# Patient Record
Sex: MALE | Race: WHITE | HISPANIC OR LATINO | Employment: UNEMPLOYED | URBAN - METROPOLITAN AREA
[De-identification: names, ages, dates, MRNs, and addresses within clinical notes are randomized per-mention and may not be internally consistent; named-entity substitution may affect disease eponyms.]

---

## 2020-07-12 ENCOUNTER — APPOINTMENT (EMERGENCY)
Dept: CT IMAGING | Facility: HOSPITAL | Age: 30
End: 2020-07-12
Payer: COMMERCIAL

## 2020-07-12 ENCOUNTER — HOSPITAL ENCOUNTER (EMERGENCY)
Facility: HOSPITAL | Age: 30
Discharge: HOME | End: 2020-07-12
Attending: EMERGENCY MEDICINE | Admitting: EMERGENCY MEDICINE
Payer: COMMERCIAL

## 2020-07-12 VITALS
SYSTOLIC BLOOD PRESSURE: 140 MMHG | DIASTOLIC BLOOD PRESSURE: 63 MMHG | WEIGHT: 315 LBS | HEART RATE: 80 BPM | TEMPERATURE: 98.1 F | RESPIRATION RATE: 20 BRPM | OXYGEN SATURATION: 97 %

## 2020-07-12 DIAGNOSIS — M54.9 BACK PAIN: Primary | ICD-10-CM

## 2020-07-12 DIAGNOSIS — T80.1XXA INTRAVENOUS INFILTRATION, INITIAL ENCOUNTER: ICD-10-CM

## 2020-07-12 LAB
ALBUMIN SERPL BCP-MCNC: 3.7 G/DL (ref 3.5–5)
ALP SERPL-CCNC: 116 U/L (ref 46–116)
ALT SERPL W P-5'-P-CCNC: 54 U/L (ref 12–78)
ANION GAP SERPL CALCULATED.3IONS-SCNC: 8 MMOL/L (ref 4–13)
AST SERPL W P-5'-P-CCNC: 32 U/L (ref 5–45)
BASOPHILS # BLD AUTO: 0.03 THOUSANDS/ΜL (ref 0–0.1)
BASOPHILS NFR BLD AUTO: 0 % (ref 0–1)
BILIRUB SERPL-MCNC: 0.5 MG/DL (ref 0.2–1)
BILIRUB UR QL STRIP: NEGATIVE
BUN SERPL-MCNC: 11 MG/DL (ref 5–25)
CALCIUM SERPL-MCNC: 8.7 MG/DL (ref 8.3–10.1)
CHLORIDE SERPL-SCNC: 104 MMOL/L (ref 100–108)
CLARITY UR: CLEAR
CO2 SERPL-SCNC: 29 MMOL/L (ref 21–32)
COLOR UR: YELLOW
CREAT SERPL-MCNC: 1.12 MG/DL (ref 0.6–1.3)
EOSINOPHIL # BLD AUTO: 0.34 THOUSAND/ΜL (ref 0–0.61)
EOSINOPHIL NFR BLD AUTO: 4 % (ref 0–6)
ERYTHROCYTE [DISTWIDTH] IN BLOOD BY AUTOMATED COUNT: 12.9 % (ref 11.6–15.1)
GFR SERPL CREATININE-BSD FRML MDRD: 88 ML/MIN/1.73SQ M
GLUCOSE SERPL-MCNC: 133 MG/DL (ref 65–140)
GLUCOSE UR STRIP-MCNC: NEGATIVE MG/DL
HCT VFR BLD AUTO: 51.1 % (ref 36.5–49.3)
HGB BLD-MCNC: 17 G/DL (ref 12–17)
HGB UR QL STRIP.AUTO: NEGATIVE
IMM GRANULOCYTES # BLD AUTO: 0.03 THOUSAND/UL (ref 0–0.2)
IMM GRANULOCYTES NFR BLD AUTO: 0 % (ref 0–2)
KETONES UR STRIP-MCNC: NEGATIVE MG/DL
LEUKOCYTE ESTERASE UR QL STRIP: NEGATIVE
LIPASE SERPL-CCNC: 120 U/L (ref 73–393)
LYMPHOCYTES # BLD AUTO: 2.77 THOUSANDS/ΜL (ref 0.6–4.47)
LYMPHOCYTES NFR BLD AUTO: 33 % (ref 14–44)
MCH RBC QN AUTO: 31.4 PG (ref 26.8–34.3)
MCHC RBC AUTO-ENTMCNC: 33.3 G/DL (ref 31.4–37.4)
MCV RBC AUTO: 94 FL (ref 82–98)
MONOCYTES # BLD AUTO: 0.63 THOUSAND/ΜL (ref 0.17–1.22)
MONOCYTES NFR BLD AUTO: 8 % (ref 4–12)
NEUTROPHILS # BLD AUTO: 4.5 THOUSANDS/ΜL (ref 1.85–7.62)
NEUTS SEG NFR BLD AUTO: 55 % (ref 43–75)
NITRITE UR QL STRIP: NEGATIVE
NRBC BLD AUTO-RTO: 0 /100 WBCS
PH UR STRIP.AUTO: 6.5 [PH]
PLATELET # BLD AUTO: 252 THOUSANDS/UL (ref 149–390)
PMV BLD AUTO: 10.7 FL (ref 8.9–12.7)
POTASSIUM SERPL-SCNC: 3.8 MMOL/L (ref 3.5–5.3)
PROT SERPL-MCNC: 7.7 G/DL (ref 6.4–8.2)
PROT UR STRIP-MCNC: NEGATIVE MG/DL
RBC # BLD AUTO: 5.42 MILLION/UL (ref 3.88–5.62)
SODIUM SERPL-SCNC: 141 MMOL/L (ref 136–145)
SP GR UR STRIP.AUTO: 1.02 (ref 1–1.03)
TROPONIN I SERPL-MCNC: <0.02 NG/ML
UROBILINOGEN UR QL STRIP.AUTO: 0.2 E.U./DL
WBC # BLD AUTO: 8.3 THOUSAND/UL (ref 4.31–10.16)

## 2020-07-12 PROCEDURE — 74150 CT ABDOMEN W/O CONTRAST: CPT

## 2020-07-12 PROCEDURE — 96360 HYDRATION IV INFUSION INIT: CPT

## 2020-07-12 PROCEDURE — 93005 ELECTROCARDIOGRAM TRACING: CPT

## 2020-07-12 PROCEDURE — 85025 COMPLETE CBC W/AUTO DIFF WBC: CPT | Performed by: EMERGENCY MEDICINE

## 2020-07-12 PROCEDURE — 96361 HYDRATE IV INFUSION ADD-ON: CPT

## 2020-07-12 PROCEDURE — 36415 COLL VENOUS BLD VENIPUNCTURE: CPT | Performed by: EMERGENCY MEDICINE

## 2020-07-12 PROCEDURE — 99284 EMERGENCY DEPT VISIT MOD MDM: CPT

## 2020-07-12 PROCEDURE — 71250 CT THORAX DX C-: CPT

## 2020-07-12 PROCEDURE — 80053 COMPREHEN METABOLIC PANEL: CPT | Performed by: EMERGENCY MEDICINE

## 2020-07-12 PROCEDURE — 81003 URINALYSIS AUTO W/O SCOPE: CPT | Performed by: EMERGENCY MEDICINE

## 2020-07-12 PROCEDURE — 83690 ASSAY OF LIPASE: CPT | Performed by: EMERGENCY MEDICINE

## 2020-07-12 PROCEDURE — 99284 EMERGENCY DEPT VISIT MOD MDM: CPT | Performed by: EMERGENCY MEDICINE

## 2020-07-12 PROCEDURE — 84484 ASSAY OF TROPONIN QUANT: CPT | Performed by: EMERGENCY MEDICINE

## 2020-07-12 RX ADMIN — IOHEXOL 100 ML: 350 INJECTION, SOLUTION INTRAVENOUS at 19:01

## 2020-07-12 RX ADMIN — SODIUM CHLORIDE 1000 ML: 0.9 INJECTION, SOLUTION INTRAVENOUS at 16:47

## 2020-07-12 NOTE — ED NOTES
Multiple attempts were made to establish a new IV  Was unable to obtain one, provider made aware       Tyson Bryant RN  07/12/20 3132
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
## 2020-07-12 NOTE — ED NOTES
Ct scan department reports peripheral IV infiltrated during scan and IV removed, this RN placed guaze on right AC, no swelling or redness reported, patient states "I have some pain on my arm", ice pack placed on arm, provider made aware, will continue to monitor      Jayant Mandel, RN  07/12/20 5979

## 2020-07-13 ENCOUNTER — TELEPHONE (OUTPATIENT)
Dept: INTERVENTIONAL RADIOLOGY/VASCULAR | Facility: HOSPITAL | Age: 30
End: 2020-07-13

## 2020-07-13 LAB
ATRIAL RATE: 94 BPM
P AXIS: 61 DEGREES
PR INTERVAL: 142 MS
QRS AXIS: 56 DEGREES
QRSD INTERVAL: 100 MS
QT INTERVAL: 376 MS
QTC INTERVAL: 470 MS
T WAVE AXIS: -5 DEGREES
VENTRICULAR RATE: 94 BPM

## 2020-07-13 PROCEDURE — 93010 ELECTROCARDIOGRAM REPORT: CPT | Performed by: INTERNAL MEDICINE

## 2020-07-13 NOTE — ED NOTES
Discharged reviewed with pt  Pt verbalized understanding and has no further questions at this time  Pt ambulatory off unit with steady gait       Elizabeth Osgood, RN  07/12/20 3244

## 2020-07-13 NOTE — TELEPHONE ENCOUNTER
Follow up done for IV contrast extravasation  Pt reports swelling has decreased  No other symptoms at this time

## 2020-08-04 ENCOUNTER — HOSPITAL ENCOUNTER (EMERGENCY)
Facility: HOSPITAL | Age: 30
Discharge: HOME | End: 2020-08-04
Attending: EMERGENCY MEDICINE | Admitting: EMERGENCY MEDICINE
Payer: COMMERCIAL

## 2020-08-04 ENCOUNTER — APPOINTMENT (EMERGENCY)
Dept: RADIOLOGY | Facility: HOSPITAL | Age: 30
End: 2020-08-04
Payer: COMMERCIAL

## 2020-08-04 VITALS
OXYGEN SATURATION: 95 % | DIASTOLIC BLOOD PRESSURE: 80 MMHG | WEIGHT: 315 LBS | SYSTOLIC BLOOD PRESSURE: 135 MMHG | HEART RATE: 76 BPM | HEIGHT: 74 IN | RESPIRATION RATE: 15 BRPM | BODY MASS INDEX: 40.43 KG/M2 | TEMPERATURE: 98.9 F

## 2020-08-04 DIAGNOSIS — R07.9 CHEST PAIN: Primary | ICD-10-CM

## 2020-08-04 LAB
ALBUMIN SERPL BCP-MCNC: 3.4 G/DL (ref 3.5–5)
ALP SERPL-CCNC: 103 U/L (ref 46–116)
ALT SERPL W P-5'-P-CCNC: 47 U/L (ref 12–78)
ANION GAP SERPL CALCULATED.3IONS-SCNC: 10 MMOL/L (ref 4–13)
AST SERPL W P-5'-P-CCNC: 23 U/L (ref 5–45)
ATRIAL RATE: 83 BPM
BASOPHILS # BLD AUTO: 0.03 THOUSANDS/ΜL (ref 0–0.1)
BASOPHILS NFR BLD AUTO: 0 % (ref 0–1)
BILIRUB SERPL-MCNC: 0.3 MG/DL (ref 0.2–1)
BUN SERPL-MCNC: 14 MG/DL (ref 5–25)
CALCIUM SERPL-MCNC: 8.6 MG/DL (ref 8.3–10.1)
CHLORIDE SERPL-SCNC: 105 MMOL/L (ref 100–108)
CO2 SERPL-SCNC: 26 MMOL/L (ref 21–32)
CREAT SERPL-MCNC: 0.94 MG/DL (ref 0.6–1.3)
EOSINOPHIL # BLD AUTO: 0.25 THOUSAND/ΜL (ref 0–0.61)
EOSINOPHIL NFR BLD AUTO: 3 % (ref 0–6)
ERYTHROCYTE [DISTWIDTH] IN BLOOD BY AUTOMATED COUNT: 12.9 % (ref 11.6–15.1)
GFR SERPL CREATININE-BSD FRML MDRD: 108 ML/MIN/1.73SQ M
GLUCOSE SERPL-MCNC: 111 MG/DL (ref 65–140)
HCT VFR BLD AUTO: 45 % (ref 36.5–49.3)
HGB BLD-MCNC: 14.9 G/DL (ref 12–17)
IMM GRANULOCYTES # BLD AUTO: 0.02 THOUSAND/UL (ref 0–0.2)
IMM GRANULOCYTES NFR BLD AUTO: 0 % (ref 0–2)
LYMPHOCYTES # BLD AUTO: 3.68 THOUSANDS/ΜL (ref 0.6–4.47)
LYMPHOCYTES NFR BLD AUTO: 43 % (ref 14–44)
MCH RBC QN AUTO: 31 PG (ref 26.8–34.3)
MCHC RBC AUTO-ENTMCNC: 33.1 G/DL (ref 31.4–37.4)
MCV RBC AUTO: 94 FL (ref 82–98)
MONOCYTES # BLD AUTO: 0.7 THOUSAND/ΜL (ref 0.17–1.22)
MONOCYTES NFR BLD AUTO: 8 % (ref 4–12)
NEUTROPHILS # BLD AUTO: 3.9 THOUSANDS/ΜL (ref 1.85–7.62)
NEUTS SEG NFR BLD AUTO: 46 % (ref 43–75)
NRBC BLD AUTO-RTO: 0 /100 WBCS
P AXIS: 32 DEGREES
PLATELET # BLD AUTO: 216 THOUSANDS/UL (ref 149–390)
PMV BLD AUTO: 11.2 FL (ref 8.9–12.7)
POTASSIUM SERPL-SCNC: 3.8 MMOL/L (ref 3.5–5.3)
PR INTERVAL: 138 MS
PROT SERPL-MCNC: 6.9 G/DL (ref 6.4–8.2)
QRS AXIS: 63 DEGREES
QRSD INTERVAL: 102 MS
QT INTERVAL: 380 MS
QTC INTERVAL: 446 MS
RBC # BLD AUTO: 4.8 MILLION/UL (ref 3.88–5.62)
SODIUM SERPL-SCNC: 141 MMOL/L (ref 136–145)
T WAVE AXIS: 14 DEGREES
TROPONIN I SERPL-MCNC: <0.02 NG/ML
VENTRICULAR RATE: 83 BPM
WBC # BLD AUTO: 8.58 THOUSAND/UL (ref 4.31–10.16)

## 2020-08-04 PROCEDURE — 99285 EMERGENCY DEPT VISIT HI MDM: CPT

## 2020-08-04 PROCEDURE — 85025 COMPLETE CBC W/AUTO DIFF WBC: CPT

## 2020-08-04 PROCEDURE — 71046 X-RAY EXAM CHEST 2 VIEWS: CPT

## 2020-08-04 PROCEDURE — 93010 ELECTROCARDIOGRAM REPORT: CPT | Performed by: INTERNAL MEDICINE

## 2020-08-04 PROCEDURE — 93005 ELECTROCARDIOGRAM TRACING: CPT

## 2020-08-04 PROCEDURE — 80053 COMPREHEN METABOLIC PANEL: CPT

## 2020-08-04 PROCEDURE — 36415 COLL VENOUS BLD VENIPUNCTURE: CPT

## 2020-08-04 PROCEDURE — 96374 THER/PROPH/DIAG INJ IV PUSH: CPT

## 2020-08-04 PROCEDURE — 99285 EMERGENCY DEPT VISIT HI MDM: CPT | Performed by: EMERGENCY MEDICINE

## 2020-08-04 PROCEDURE — 84484 ASSAY OF TROPONIN QUANT: CPT

## 2020-08-04 RX ORDER — KETOROLAC TROMETHAMINE 30 MG/ML
15 INJECTION, SOLUTION INTRAMUSCULAR; INTRAVENOUS ONCE
Status: COMPLETED | OUTPATIENT
Start: 2020-08-04 | End: 2020-08-04

## 2020-08-04 RX ORDER — NAPROXEN 500 MG/1
500 TABLET ORAL 2 TIMES DAILY WITH MEALS
Qty: 30 TABLET | Refills: 0 | Status: SHIPPED | OUTPATIENT
Start: 2020-08-04 | End: 2020-08-14 | Stop reason: HOSPADM

## 2020-08-04 RX ADMIN — KETOROLAC TROMETHAMINE 15 MG: 30 INJECTION, SOLUTION INTRAMUSCULAR at 08:40

## 2020-08-04 NOTE — DISCHARGE INSTRUCTIONS
Take naproxen as needed for pain  Please call the number provided as soon as possible for primary care doctor  Determining the exact cause for all patients with chest pain is extremely difficult in the emergency department  Our primary focus is to rule-out immediate life-threatening diseases  If this cannot be done, the definitive diagnosis frequently needs to be determined over time  Sometimes, specialist are required cardiologists, pulmonologists, gastroenterologists or surgeons  Many times the primary care physcians can determine the cause by following the symptoms over time  Please return immediately to the Emergency Department for fever>101, vomiting, shortness of breath, worsening symptoms, pain with exertion, passing out, intractable pain or any other concerns

## 2020-08-12 ENCOUNTER — APPOINTMENT (EMERGENCY)
Dept: CT IMAGING | Facility: HOSPITAL | Age: 30
End: 2020-08-12
Payer: COMMERCIAL

## 2020-08-12 ENCOUNTER — HOSPITAL ENCOUNTER (OUTPATIENT)
Facility: HOSPITAL | Age: 30
Setting detail: OBSERVATION
Discharge: HOME | End: 2020-08-14
Attending: EMERGENCY MEDICINE | Admitting: INTERNAL MEDICINE
Payer: COMMERCIAL

## 2020-08-12 DIAGNOSIS — R29.898 ARM WEAKNESS: Primary | ICD-10-CM

## 2020-08-12 DIAGNOSIS — R29.898 LEG WEAKNESS: ICD-10-CM

## 2020-08-12 PROBLEM — I10 ESSENTIAL HYPERTENSION: Status: ACTIVE | Noted: 2020-08-12

## 2020-08-12 PROBLEM — Z72.0 TOBACCO ABUSE: Status: ACTIVE | Noted: 2020-08-12

## 2020-08-12 PROBLEM — E66.01 MORBID OBESITY (HCC): Status: ACTIVE | Noted: 2020-08-12

## 2020-08-12 LAB
ANION GAP SERPL CALCULATED.3IONS-SCNC: 6 MMOL/L (ref 4–13)
APTT PPP: 27 SECONDS (ref 23–37)
BUN SERPL-MCNC: 11 MG/DL (ref 5–25)
CALCIUM SERPL-MCNC: 9.1 MG/DL (ref 8.3–10.1)
CHLORIDE SERPL-SCNC: 105 MMOL/L (ref 100–108)
CO2 SERPL-SCNC: 30 MMOL/L (ref 21–32)
CREAT SERPL-MCNC: 0.9 MG/DL (ref 0.6–1.3)
ERYTHROCYTE [DISTWIDTH] IN BLOOD BY AUTOMATED COUNT: 12.7 % (ref 11.6–15.1)
GFR SERPL CREATININE-BSD FRML MDRD: 114 ML/MIN/1.73SQ M
GLUCOSE SERPL-MCNC: 117 MG/DL (ref 65–140)
GLUCOSE SERPL-MCNC: 96 MG/DL (ref 65–140)
HCT VFR BLD AUTO: 48 % (ref 36.5–49.3)
HGB BLD-MCNC: 16 G/DL (ref 12–17)
INR PPP: 1.01 (ref 0.84–1.19)
MCH RBC QN AUTO: 31.2 PG (ref 26.8–34.3)
MCHC RBC AUTO-ENTMCNC: 33.3 G/DL (ref 31.4–37.4)
MCV RBC AUTO: 94 FL (ref 82–98)
PLATELET # BLD AUTO: 219 THOUSANDS/UL (ref 149–390)
PMV BLD AUTO: 10.7 FL (ref 8.9–12.7)
POTASSIUM SERPL-SCNC: 4 MMOL/L (ref 3.5–5.3)
PROTHROMBIN TIME: 12.8 SECONDS (ref 11.6–14.5)
RBC # BLD AUTO: 5.13 MILLION/UL (ref 3.88–5.62)
SARS-COV-2 RNA RESP QL NAA+PROBE: NEGATIVE
SODIUM SERPL-SCNC: 141 MMOL/L (ref 136–145)
TROPONIN I SERPL-MCNC: <0.02 NG/ML
WBC # BLD AUTO: 6.42 THOUSAND/UL (ref 4.31–10.16)

## 2020-08-12 PROCEDURE — 99284 EMERGENCY DEPT VISIT MOD MDM: CPT | Performed by: EMERGENCY MEDICINE

## 2020-08-12 PROCEDURE — 85610 PROTHROMBIN TIME: CPT | Performed by: EMERGENCY MEDICINE

## 2020-08-12 PROCEDURE — 70496 CT ANGIOGRAPHY HEAD: CPT

## 2020-08-12 PROCEDURE — 87635 SARS-COV-2 COVID-19 AMP PRB: CPT | Performed by: EMERGENCY MEDICINE

## 2020-08-12 PROCEDURE — 82948 REAGENT STRIP/BLOOD GLUCOSE: CPT

## 2020-08-12 PROCEDURE — 93005 ELECTROCARDIOGRAM TRACING: CPT

## 2020-08-12 PROCEDURE — 70498 CT ANGIOGRAPHY NECK: CPT

## 2020-08-12 PROCEDURE — 36415 COLL VENOUS BLD VENIPUNCTURE: CPT | Performed by: EMERGENCY MEDICINE

## 2020-08-12 PROCEDURE — 99220 PR INITIAL OBSERVATION CARE/DAY 70 MINUTES: CPT | Performed by: INTERNAL MEDICINE

## 2020-08-12 PROCEDURE — G1004 CDSM NDSC: HCPCS

## 2020-08-12 PROCEDURE — 99285 EMERGENCY DEPT VISIT HI MDM: CPT

## 2020-08-12 PROCEDURE — 80048 BASIC METABOLIC PNL TOTAL CA: CPT | Performed by: EMERGENCY MEDICINE

## 2020-08-12 PROCEDURE — 84484 ASSAY OF TROPONIN QUANT: CPT | Performed by: EMERGENCY MEDICINE

## 2020-08-12 PROCEDURE — 85027 COMPLETE CBC AUTOMATED: CPT | Performed by: EMERGENCY MEDICINE

## 2020-08-12 PROCEDURE — 85730 THROMBOPLASTIN TIME PARTIAL: CPT | Performed by: EMERGENCY MEDICINE

## 2020-08-12 RX ORDER — ASPIRIN 81 MG/1
81 TABLET, CHEWABLE ORAL DAILY
Status: DISCONTINUED | OUTPATIENT
Start: 2020-08-13 | End: 2020-08-14

## 2020-08-12 RX ORDER — AMLODIPINE BESYLATE 5 MG/1
5 TABLET ORAL DAILY
Status: DISCONTINUED | OUTPATIENT
Start: 2020-08-13 | End: 2020-08-14 | Stop reason: HOSPADM

## 2020-08-12 RX ORDER — HEPARIN SODIUM 5000 [USP'U]/ML
5000 INJECTION, SOLUTION INTRAVENOUS; SUBCUTANEOUS EVERY 8 HOURS SCHEDULED
Status: DISCONTINUED | OUTPATIENT
Start: 2020-08-12 | End: 2020-08-14 | Stop reason: HOSPADM

## 2020-08-12 RX ORDER — ASPIRIN 325 MG
325 TABLET ORAL ONCE
Status: COMPLETED | OUTPATIENT
Start: 2020-08-12 | End: 2020-08-12

## 2020-08-12 RX ORDER — ATORVASTATIN CALCIUM 40 MG/1
40 TABLET, FILM COATED ORAL EVERY EVENING
Status: DISCONTINUED | OUTPATIENT
Start: 2020-08-12 | End: 2020-08-14

## 2020-08-12 RX ORDER — NICOTINE 21 MG/24HR
1 PATCH, TRANSDERMAL 24 HOURS TRANSDERMAL DAILY
Status: DISCONTINUED | OUTPATIENT
Start: 2020-08-13 | End: 2020-08-14 | Stop reason: HOSPADM

## 2020-08-12 RX ORDER — AMLODIPINE BESYLATE 5 MG/1
5 TABLET ORAL DAILY
COMMUNITY

## 2020-08-12 RX ORDER — ONDANSETRON 2 MG/ML
4 INJECTION INTRAMUSCULAR; INTRAVENOUS EVERY 6 HOURS PRN
Status: DISCONTINUED | OUTPATIENT
Start: 2020-08-12 | End: 2020-08-14 | Stop reason: HOSPADM

## 2020-08-12 RX ADMIN — IOHEXOL 85 ML: 350 INJECTION, SOLUTION INTRAVENOUS at 17:32

## 2020-08-12 RX ADMIN — ASPIRIN 325 MG ORAL TABLET 325 MG: 325 PILL ORAL at 18:53

## 2020-08-12 RX ADMIN — ATORVASTATIN CALCIUM 40 MG: 40 TABLET, FILM COATED ORAL at 20:24

## 2020-08-12 RX ADMIN — HEPARIN SODIUM 5000 UNITS: 5000 INJECTION INTRAVENOUS; SUBCUTANEOUS at 21:34

## 2020-08-12 NOTE — H&P
History and Physical - [facility] Internal Medicine    Patient Information: Fely Torres 26 y o  male MRN: [id number]  Unit/Bed#: -01 Encounter: [id number]  Admitting Physician: Cecilio Suazo MD  PCP: Elina Strauss MD  Date of Admission:  08/12/20    Assessment/Plan:      * Arm weakness  Assessment & Plan  ? Stroke  Stroke workup in process  Would give him aspirin and statin  Continue his antihypertensive medication with hold parameter  PT/OT  Neurology service has already called in from the ER  Patient is also morbidly obese and he was advised on healthy diet and exercise to lose some weight    Tobacco abuse  Assessment & Plan  Nicotine patch and counseling    Morbid obesity ([facility] [address] )  Assessment & Plan  Diet and exercise    Essential hypertension  Assessment & Plan  Continue home medications with some hold parameters      Present on Admission:   Arm weakness   Essential hypertension   Morbid obesity ([facility] [address] )   Tobacco abuse        VTE Prophylaxis: Heparin  / sequential compression device   Code Status:  Full code  POLST: There is no POLST form on file for this patient (pre-hospital)    Anticipated Length of Stay:  Patient will be admitted on an Observation basis with an anticipated length of stay of  less than 2 midnights  Justification for Hospital Stay:  Left arm weakness    Total Time for Visit, including Counseling / Coordination of Care: 45+ minutes  Greater than 50% of this total time spent on direct patient counseling and coordination of care  Chief Complaint:   Off and on left arm/left-sided weakness    History of Present Illness:    Fely Torres is a 26 y o  male who presents with off and on left-sided weakness  As per patient, he has been having some trouble walking at times and also dizzy and lightheaded  He saw his primary care physician    He was put on antihypertensive medication and asked to see a neurologist   He came here as his appointment with neurologist was not until 1st week of next month  He is left handed, however, at the moment he states that his left arm weakness symptoms have resolved and he is back to baseline  No chest pain  No shortness of breath  No nausea vomiting  No fever or chills  Review of Systems    All systems are reviewed  Positive as per history of presenting illness  Patient answered no to all other questions  Past Medical and Surgical History:     Past Medical History:   Diagnosis Date    Hypertension        Past Surgical History:   Procedure Laterality Date    SHOULDER SURGERY         Meds/Allergies:    Prior to Admission medications    Medication Sig Start Date End Date Taking? Authorizing Provider   amLODIPine (NORVASC) 5 mg tablet Take 5 mg by mouth daily   Yes Historical Provider, MD   naproxen (NAPROSYN) 500 mg tablet Take 1 tablet (500 mg total) by mouth 2 (two) times a day with meals  Patient not taking: Reported on 8/12/2020 8/4/20   Lul Arroyo MD     Reviewed as documented above    Allergies: No Known Allergies    Social History:     Marital Status: Single   Occupation:  Working  Patient Pre-hospital Living Situation:  With family  Patient Pre-hospital Level of Mobility:  Independent  Patient Pre-hospital Diet Restrictions:  None  Substance Use History:   Social History     Substance and Sexual Activity   Alcohol Use Yes     Social History     Tobacco Use   Smoking Status Current Every Day Smoker    Packs/day: 0 50   Smokeless Tobacco Never Used     Social History     Substance and Sexual Activity   Drug Use Yes    Types: Marijuana       Family History:    Family history is reviewed  He has a brother who had a stroke at age 32   Also has an uncle who had a stroke at age 47        Physical Exam      Vitals:   Blood Pressure: 119/77 (08/12/20 1845)  Pulse: 82 (08/12/20 1845)  Temperature: 98 5 °F (36 9 °C) (08/12/20 1558)  Temp Source: Oral (08/12/20 1558)  Respirations: 15 (08/12/20 1845)  Height: 6' 2" (188 cm) (08/12/20 1558)  Weight - Scale: (!) 165 kg (363 lb 8 6 oz) (08/12/20 1558)  SpO2: 95 % (08/12/20 1845)    Vital signs are reviewed as above  Constitutional:  Obese male who is sitting in stretcher in the ER  Not in any respiratory distress  Eyes: EOM grossly intact  Conjunctivae slightly pale  Patient has anicteric sclera  HENT: Oropharynx are crowded and moist  Did not notice any significant lesions on the tongue  Head normocephalic  Neck: Neck is obese and supple  I was not able to visualize any JVD at 90°  There is no significant lymphadenopathy  I also did not notice any significant thyromegaly  Cardiac: I did not hear any rubs or gallop  Patient appears to be in sinus rhythm  Respiratory: Patient not in significant respiratory distress  Air entry in general is fair  GI: Abdomen is obese and soft  It is grossly nontender  Bowel sounds are adequate  I was not able to appreciate any hepatosplenomegaly  Neurologic:  Patient is awake and alert  Neurological examination is grossly intact  No obvious focal neurological deficit noticed  Skin: Skin is warm and dry  Psychiatric: Mood and affect are pleasant  Musculoskeletal  Patient moving all extremities   Has chronic arthritic joints   Extremities: Patient has no significant cyanosis, clubbing, or lower extremity edema           Additional Data:     Lab Results: I have personally reviewed pertinent reports  Results from last 7 days   Lab Units 08/12/20  1643   WBC Thousand/uL 6 42   HEMOGLOBIN g/dL 16 0   HEMATOCRIT % 48 0   PLATELETS Thousands/uL 219     Results from last 7 days   Lab Units 08/12/20  1643   POTASSIUM mmol/L 4 0   CHLORIDE mmol/L 105   CO2 mmol/L 30   BUN mg/dL 11   CREATININE mg/dL 0 90   CALCIUM mg/dL 9 1     Results from last 7 days   Lab Units 08/12/20  1643   INR  1 01       Imaging: I have personally reviewed pertinent reports        Cta Head And Neck W Wo Contrast    Result Date: 8/12/2020  Narrative: CTA NECK AND BRAIN WITH AND WITHOUT CONTRAST INDICATION: Stroke, follow up subacute cva suspected COMPARISON:   None  TECHNIQUE:  Routine CT imaging of the Brain without contrast   Post contrast imaging was performed after administration of iodinated contrast through the neck and brain  Post contrast axial 0 625 mm images timed to opacify the arterial system  3D rendering was performed on an independent workstation  MIP reconstructions performed  Coronal reconstructions were performed of the noncontrast portion of the brain  Radiation dose length product (DLP) for this visit:  1318 mGy-cm   This examination, like all CT scans performed in the [facility], was performed utilizing techniques to minimize radiation dose exposure, including the use of iterative reconstruction and automated exposure control  IV Contrast:  85 mL of iohexol (OMNIPAQUE)  IMAGE QUALITY:   Diagnostic FINDINGS: NONCONTRAST BRAIN PARENCHYMA:  No intracranial mass, mass effect or midline shift  No CT signs of acute infarction  No acute parenchymal hemorrhage  VENTRICLES AND EXTRA-AXIAL SPACES:  Normal for the patient's age  VISUALIZED ORBITS AND PARANASAL SINUSES:  Unremarkable  CERVICAL VASCULATURE AORTIC ARCH AND GREAT VESSELS:  Normal aortic arch and great vessel origins  Normal visualized subclavian vessels  RIGHT VERTEBRAL ARTERY CERVICAL SEGMENT:  Normal origin  The vessel is normal in caliber throughout the neck  LEFT VERTEBRAL ARTERY CERVICAL SEGMENT:  Normal origin  The vessel is normal in caliber throughout the neck  RIGHT EXTRACRANIAL CAROTID SEGMENT:  Normal caliber common carotid artery  Normal bifurcation and cervical internal carotid artery  No stenosis or dissection  LEFT EXTRACRANIAL CAROTID SEGMENT:  Normal caliber common carotid artery  Normal bifurcation and cervical internal carotid artery  No stenosis or dissection  NASCET criteria was used to determine the degree of internal carotid artery diameter stenosis   INTRACRANIAL VASCULATURE INTERNAL CAROTID ARTERIES:  Normal enhancement of the intracranial portions of the internal carotid arteries  Normal ophthalmic artery origins  Normal ICA terminus  ANTERIOR CIRCULATION:  Symmetric A1 segments and anterior cerebral arteries with normal enhancement  Normal anterior communicating artery  MIDDLE CEREBRAL ARTERY CIRCULATION:  M1 segment and middle cerebral artery branches demonstrate normal enhancement bilaterally  DISTAL VERTEBRAL ARTERIES:  Normal distal vertebral arteries  Posterior inferior cerebellar artery origins are normal  Normal vertebral basilar junction  BASILAR ARTERY:  Basilar artery is normal in caliber  Normal superior cerebellar arteries  POSTERIOR CEREBRAL ARTERIES: Both posterior cerebral arteries arises from the basilar tip  Both arteries demonstrate normal enhancement  Normal posterior communicating arteries  DURAL VENOUS SINUSES:  Normal  NON VASCULAR ANATOMY BONY STRUCTURES:  No acute osseous abnormality  SOFT TISSUES OF THE NECK:  Normal  THORACIC INLET:  Unremarkable  Impression: 1  No evidence of acute intracranial hemorrhage  2  No evidence of hemodynamic significant stenosis, aneurysm or dissection  If persistent focal neurologic deficit, consider follow-up noncontrast brain MRI Workstation performed: [id number]     Xr Chest 2 Views    Result Date: 8/4/2020  Narrative: CHEST INDICATION:   Chest Pain  COMPARISON:  June 12, 2020 EXAM PERFORMED/VIEWS:  XR CHEST PA & LATERAL Images: 2 FINDINGS:  Lungs adequately aerated  No lobar consolidation or large effusion  Scarring left lower lobe  Cardiac size within normal limits  No vascular congestion or peribronchial thickening  No pneumothorax or free air  Osseous structures appear within normal limits for patient age  Impression: No acute cardiopulmonary disease  Workstation performed: [id number]       EKG, Pathology, and Other Studies Reviewed on Admission:   · EKG:  Sinus rhythm    Do not see any acute ischemic changes    Allscripts Records Reviewed: No     ** Please Note: Dragon 360 Dictation voice to text software may have been used in the creation of this document   **

## 2020-08-12 NOTE — ASSESSMENT & PLAN NOTE
Left face, arm and leg weakness rule out stroke  Patient with H/o left-sided face, arm and leg weakness  Patient also complains of B/L blurry vision, lightheadedness, slurry speech started intermittently followed by constant since 3 weeks  All labs including troponin x3, EKG, CTA head and neck, MRI brain, lipid panel, PT/PTT are normal   Neurology was consulted and are thinking symptoms possibly due to stress reaction  Patient does complain of sleep deprivation, feeling of apprehension during sleep, dyspnea during sleep, not using CPAP at home  Plan  -MRI of brain was normal   -continue PT OT  -stop aspirin , heparin and statin  -swallow study showed no difficulty swallowing

## 2020-08-12 NOTE — ED PROVIDER NOTES
History  Chief Complaint   Patient presents with    Weakness - Generalized     states he has left sided weakness and dizziness for 2 weeks  put on BP meds 2 weeks ago and had similar symptoms with no improvement  Patient is a 26-year-old male past medical history of hypertension presenting with left arm, leg, facial weakness x2 0 5 weeks  Patient states that he has had intermittent weakness to the entire left side of his body, which has been constant for several days now  He states that he was seen here previously and received a CT scan but was unable to receive IV dye and 4 was sent home  He states he follow-up with his primary care about these complaints 2 weeks ago and was placed on blood pressure medication and told to follow up with Neurology, limit on 09/02  He he states that nothing seems to make the weakness any better or worse and also admits to intermittent decreased sensation to that side  He also notes intermittent stuttering of speech in issues finding words  He denies any falls or head injuries, any other new medications and states he is eating and drinking normally  He also notes lightheadedness in that same time frame and bilaterally blurred vision  He denies any chest pain, shortness breath, fevers, nausea vomiting, rashes, dysuria  Denies any sick contacts, travel  He states that he is concerned because he has a brother and uncle who had strokes and his brother was in his 35s  Prior to Admission Medications   Prescriptions Last Dose Informant Patient Reported? Taking?    amLODIPine (NORVASC) 5 mg tablet   Yes Yes   Sig: Take 5 mg by mouth daily   naproxen (NAPROSYN) 500 mg tablet Not Taking at Unknown time  No No   Sig: Take 1 tablet (500 mg total) by mouth 2 (two) times a day with meals   Patient not taking: Reported on 8/12/2020      Facility-Administered Medications: None       Past Medical History:   Diagnosis Date    Hypertension        Past Surgical History: Procedure Laterality Date    SHOULDER SURGERY         History reviewed  No pertinent family history  I have reviewed and agree with the history as documented  E-Cigarette/Vaping     E-Cigarette/Vaping Substances     Social History     Tobacco Use    Smoking status: Current Every Day Smoker     Packs/day: 0 50    Smokeless tobacco: Never Used   Substance Use Topics    Alcohol use: Yes    Drug use: Yes     Types: Marijuana       Review of Systems   All other systems reviewed and are negative  Physical Exam  Physical Exam  Constitutional:       Appearance: Normal appearance  He is not ill-appearing  HENT:      Head: Normocephalic and atraumatic  Mouth/Throat:      Mouth: Mucous membranes are moist    Eyes:      Extraocular Movements: Extraocular movements intact  Conjunctiva/sclera: Conjunctivae normal       Pupils: Pupils are equal, round, and reactive to light  Neck:      Musculoskeletal: Normal range of motion  Cardiovascular:      Rate and Rhythm: Normal rate and regular rhythm  Heart sounds: Normal heart sounds  Pulmonary:      Effort: Pulmonary effort is normal       Breath sounds: Normal breath sounds  Abdominal:      General: Abdomen is flat  Palpations: Abdomen is soft  Musculoskeletal: Normal range of motion  General: No tenderness  Comments: No midline spinal tenderness   Skin:     General: Skin is warm  Neurological:      Mental Status: He is alert and oriented to person, place, and time  Cranial Nerves: No cranial nerve deficit  Coordination: Coordination normal       Gait: Gait normal       Comments: Patient reports subjectively decreased sensation to the left upper and lower extremity with intact sensation to sharp and dull touch to the left upper and lower extremity    Patient has mildly decreased motor strength of the upper and lower extremity but no drift   Psychiatric:         Mood and Affect: Mood normal          Vital Signs  ED Triage Vitals [08/12/20 1558]   Temperature Pulse Respirations Blood Pressure SpO2   98 5 °F (36 9 °C) 90 16 162/86 97 %      Temp Source Heart Rate Source Patient Position - Orthostatic VS BP Location FiO2 (%)   Oral Monitor -- Right arm --      Pain Score       --           Vitals:    08/12/20 1558 08/12/20 1645 08/12/20 1745   BP: 162/86 119/69 129/74   Pulse: 90 84 78         Visual Acuity  Visual Acuity      Most Recent Value   L Pupil Size (mm)  4   R Pupil Size (mm)  4          ED Medications  Medications   iohexol (OMNIPAQUE) 350 MG/ML injection (MULTI-DOSE) 85 mL (85 mL Intravenous Given 8/12/20 1732)       Diagnostic Studies  Results Reviewed     Procedure Component Value Units Date/Time    Novel Coronavirus Levi Carter [[id number]]  (Normal) Collected:  08/12/20 1643    Lab Status:  Final result Specimen:  Nares from Nose Updated:  08/12/20 1741     SARS-CoV-2 Negative    Narrative: The specimen collection materials, transport medium, and/or testing methodology utilized in the production of these test results have been proven to be reliable in a limited validation with an abbreviated program under the Emergency Utilization Authorization provided by the FDA  Testing reported as "Presumptive positive" will be confirmed with secondary testing with a reference laboratory to ensure result accuracy  Clinical caution and judgement should be used with the interpretation of these results with consideration of the clinical impression and other laboratory testing  Testing reported as "Positive" or "Negative" has been proven to be accurate according to standard laboratory validation requirements  All testing is performed with control materials showing appropriate reactivity at standard intervals        Troponin I [[id number]]  (Normal) Collected:  08/12/20 1643    Lab Status:  Final result Specimen:  Blood from Arm, Right Updated:  08/12/20 1711     Troponin I <0 02 ng/mL     Protime-INR [[id number]] (Normal) Collected:  08/12/20 1643    Lab Status:  Final result Specimen:  Blood from Arm, Right Updated:  08/12/20 1703     Protime 12 8 seconds      INR 1 01    APTT [[id number]]  (Normal) Collected:  08/12/20 1643    Lab Status:  Final result Specimen:  Blood from Arm, Right Updated:  08/12/20 1703     PTT 27 seconds     Basic metabolic panel [[id number]] Collected:  08/12/20 1643    Lab Status:  Final result Specimen:  Blood from Arm, Right Updated:  08/12/20 1702     Sodium 141 mmol/L      Potassium 4 0 mmol/L      Chloride 105 mmol/L      CO2 30 mmol/L      ANION GAP 6 mmol/L      BUN 11 mg/dL      Creatinine 0 90 mg/dL      Glucose 96 mg/dL      Calcium 9 1 mg/dL      eGFR 114 ml/min/1 73sq m     Narrative:       [place] guidelines for Chronic Kidney Disease (CKD):     Stage 1 with normal or high GFR (GFR > 90 mL/min/1 73 square meters)    Stage 2 Mild CKD (GFR = 60-89 mL/min/1 73 square meters)    Stage 3A Moderate CKD (GFR = 45-59 mL/min/1 73 square meters)    Stage 3B Moderate CKD (GFR = 30-44 mL/min/1 73 square meters)    Stage 4 Severe CKD (GFR = 15-29 mL/min/1 73 square meters)    Stage 5 End Stage CKD (GFR <15 mL/min/1 73 square meters)  Note: GFR calculation is accurate only with a steady state creatinine    CBC and Platelet [[id number]]  (Normal) Collected:  08/12/20 1643    Lab Status:  Final result Specimen:  Blood from Arm, Right Updated:  08/12/20 1649     WBC 6 42 Thousand/uL      RBC 5 13 Million/uL      Hemoglobin 16 0 g/dL      Hematocrit 48 0 %      MCV 94 fL      MCH 31 2 pg      MCHC 33 3 g/dL      RDW 12 7 %      Platelets 642 Thousands/uL      MPV 10 7 fL     Fingerstick Glucose (POCT) [[id number]]  (Normal) Collected:  08/12/20 1600    Lab Status:  Final result Updated:  08/12/20 1601     POC Glucose 117 mg/dl                  CTA head and neck w wo contrast   Final Result by Gen Thomas MD (08/12 1754)         1   No evidence of acute intracranial hemorrhage  2  No evidence of hemodynamic significant stenosis, aneurysm or dissection  If persistent focal neurologic deficit, consider follow-up noncontrast brain MRI                  Workstation performed: [id number]                    Procedures  Procedures         ED Course  ED Course as of Aug 12 1836   Wed Aug 12, 2020   1800 CTA head and neck w wo contrast   1836 Patient admitted under observation for MRI per neurology, all studies negative  US AUDIT      Most Recent Value   Initial Alcohol Screen: US AUDIT-C    1  How often do you have a drink containing alcohol?  0 Filed at: 08/12/2020 1559   2  How many drinks containing alcohol do you have on a typical day you are drinking? 0 Filed at: 08/12/2020 1559   3a  Male UNDER 65: How often do you have five or more drinks on one occasion? 0 Filed at: 08/12/2020 1559   3b  FEMALE Any Age, or MALE 65+: How often do you have 4 or more drinks on one occassion? 0 Filed at: 08/12/2020 1559   Audit-C Score  0 Filed at: 08/12/2020 1559                Stroke Assessment     Row Name 08/12/20 1702             NIH Stroke Scale    Interval  7-10 days      Level of Consciousness (1a )  0      LOC Questions (1b )  0      LOC Commands (1c )  0      Best Gaze (2 )  0      Visual (3 )  0      Facial Palsy (4 )  0      Motor Arm, Left (5a )  0      Motor Arm, Right (5b )  0      Motor Leg, Left (6a )  0      Motor Leg, Right (6b )  0      Limb Ataxia (7 )  0      Sensory (8 )  0      Best Language (9 )  0      Dysarthria (10 )  0      Extinction and Inattention (11 ) (Formerly Neglect)  0      Total  0          TEZ/DAST-10      Most Recent Value   How many times in the past year have you    Used an illegal drug or used a prescription medication for non-medical reasons? Never Filed at: 08/12/2020 1600                                MDM  Number of Diagnoses or Management Options  Diagnosis management comments:  The patient is a 21-year-old male past medical history of hypertension presenting for unilateral weakness  Patient is well-appearing at bedside with stable vitals and in no acute distress  Patient has subjectively decreased motor strength of the upper and lower extremity on the left side with no visible facial droop, cranial nerves intact, ambulatory bedside with steady gait, no drift in the upper or lower extremities when raised off the bed, subjectively decreased sensation but no decreased appreciation of sharp and dull touch, and no other acute neurologic deficit appreciated  Will obtain subacute stroke workup due to symptoms and family history  Disposition  Final diagnoses:   Arm weakness     Time reflects when diagnosis was documented in both MDM as applicable and the Disposition within this note     Time User Action Codes Description Comment    8/12/2020  6:33 PM Vernia Done [R29 [id number]] Arm weakness     8/12/2020  6:33 PM [facility], [address] [place] [address] [[id number]] Arm weakness       ED Disposition     ED Disposition Condition Date/Time Comment    Admit Stable Wed Aug 12, 2020  6:32 PM Case was discussed with Jamie Kay and the patient's admission status was agreed to be Admission Status: observation status to the service of Dr Jamie Kay          Follow-up Information    None         Patient's Medications   Discharge Prescriptions    No medications on file     No discharge procedures on file      PDMP Review     None          ED Provider  Electronically Signed by           Marianne Leung DO  08/12/20 5024

## 2020-08-13 ENCOUNTER — APPOINTMENT (OUTPATIENT)
Dept: NON INVASIVE DIAGNOSTICS | Facility: HOSPITAL | Age: 30
End: 2020-08-13
Payer: COMMERCIAL

## 2020-08-13 ENCOUNTER — APPOINTMENT (OUTPATIENT)
Dept: MRI IMAGING | Facility: HOSPITAL | Age: 30
End: 2020-08-13
Payer: COMMERCIAL

## 2020-08-13 PROBLEM — G47.33 OSA (OBSTRUCTIVE SLEEP APNEA): Status: ACTIVE | Noted: 2020-08-13

## 2020-08-13 PROBLEM — R29.90 STROKE-LIKE SYMPTOMS: Status: ACTIVE | Noted: 2020-08-12

## 2020-08-13 LAB
ATRIAL RATE: 90 BPM
BILIRUB UR QL STRIP: NEGATIVE
CHOLEST SERPL-MCNC: 180 MG/DL (ref 50–200)
CLARITY UR: CLEAR
COLOR UR: YELLOW
EST. AVERAGE GLUCOSE BLD GHB EST-MCNC: 117 MG/DL
GLUCOSE UR STRIP-MCNC: NEGATIVE MG/DL
HBA1C MFR BLD: 5.7 %
HDLC SERPL-MCNC: 37 MG/DL
HGB UR QL STRIP.AUTO: NEGATIVE
KETONES UR STRIP-MCNC: NEGATIVE MG/DL
LDLC SERPL CALC-MCNC: 112 MG/DL (ref 0–100)
LEUKOCYTE ESTERASE UR QL STRIP: NEGATIVE
NITRITE UR QL STRIP: NEGATIVE
P AXIS: 25 DEGREES
PH UR STRIP.AUTO: 6.5 [PH]
PR INTERVAL: 142 MS
PROT UR STRIP-MCNC: NEGATIVE MG/DL
QRS AXIS: 66 DEGREES
QRSD INTERVAL: 100 MS
QT INTERVAL: 366 MS
QTC INTERVAL: 447 MS
SP GR UR STRIP.AUTO: 1.02 (ref 1–1.03)
T WAVE AXIS: -4 DEGREES
TRIGL SERPL-MCNC: 155 MG/DL
UROBILINOGEN UR QL STRIP.AUTO: 1 E.U./DL
VENTRICULAR RATE: 90 BPM

## 2020-08-13 PROCEDURE — 70551 MRI BRAIN STEM W/O DYE: CPT

## 2020-08-13 PROCEDURE — 93010 ELECTROCARDIOGRAM REPORT: CPT | Performed by: INTERNAL MEDICINE

## 2020-08-13 PROCEDURE — 93799 UNLISTED CV SVC/PROCEDURE: CPT

## 2020-08-13 PROCEDURE — 93306 TTE W/DOPPLER COMPLETE: CPT | Performed by: INTERNAL MEDICINE

## 2020-08-13 PROCEDURE — 80061 LIPID PANEL: CPT | Performed by: INTERNAL MEDICINE

## 2020-08-13 PROCEDURE — 99245 OFF/OP CONSLTJ NEW/EST HI 55: CPT | Performed by: PSYCHIATRY & NEUROLOGY

## 2020-08-13 PROCEDURE — 93306 TTE W/DOPPLER COMPLETE: CPT

## 2020-08-13 PROCEDURE — G1004 CDSM NDSC: HCPCS

## 2020-08-13 PROCEDURE — 81003 URINALYSIS AUTO W/O SCOPE: CPT | Performed by: INTERNAL MEDICINE

## 2020-08-13 PROCEDURE — 83036 HEMOGLOBIN GLYCOSYLATED A1C: CPT | Performed by: INTERNAL MEDICINE

## 2020-08-13 PROCEDURE — 97161 PT EVAL LOW COMPLEX 20 MIN: CPT

## 2020-08-13 PROCEDURE — 97165 OT EVAL LOW COMPLEX 30 MIN: CPT

## 2020-08-13 PROCEDURE — 99232 SBSQ HOSP IP/OBS MODERATE 35: CPT | Performed by: STUDENT IN AN ORGANIZED HEALTH CARE EDUCATION/TRAINING PROGRAM

## 2020-08-13 RX ORDER — LORAZEPAM 1 MG/1
1 TABLET ORAL
Status: COMPLETED | OUTPATIENT
Start: 2020-08-13 | End: 2020-08-13

## 2020-08-13 RX ADMIN — HEPARIN SODIUM 5000 UNITS: 5000 INJECTION INTRAVENOUS; SUBCUTANEOUS at 21:20

## 2020-08-13 RX ADMIN — HEPARIN SODIUM 5000 UNITS: 5000 INJECTION INTRAVENOUS; SUBCUTANEOUS at 13:44

## 2020-08-13 RX ADMIN — LORAZEPAM 1 MG: 1 TABLET ORAL at 19:33

## 2020-08-13 RX ADMIN — ASPIRIN 81 MG 81 MG: 81 TABLET ORAL at 09:01

## 2020-08-13 RX ADMIN — ATORVASTATIN CALCIUM 40 MG: 40 TABLET, FILM COATED ORAL at 17:47

## 2020-08-13 RX ADMIN — AMLODIPINE BESYLATE 5 MG: 5 TABLET ORAL at 09:01

## 2020-08-13 RX ADMIN — HEPARIN SODIUM 5000 UNITS: 5000 INJECTION INTRAVENOUS; SUBCUTANEOUS at 05:27

## 2020-08-13 NOTE — PLAN OF CARE
Problem: Potential for Falls  Goal: Patient will remain free of falls  Description: INTERVENTIONS:  - Assess patient frequently for physical needs  -  Identify cognitive and physical deficits and behaviors that affect risk of falls    -  [place] fall precautions as indicated by assessment   - Educate patient/family on patient safety including physical limitations  - Instruct patient to call for assistance with activity based on assessment  - Modify environment to reduce risk of injury  - Consider OT/PT consult to assist with strengthening/mobility  Outcome: Progressing

## 2020-08-13 NOTE — ASSESSMENT & PLAN NOTE
Left face, arm and leg weakness rule out stroke  Patient with H/o left-sided face, arm and leg weakness  Patient also complains of B/L blurry vision, lightheadedness, slurry speech started intermittently followed by constant since 3 weeks  H/o stroke in brother in 35s  All labs including troponin x3, EKG, CTA head and neck, MRI brain, lipid panel, PT/PTT are normal   Neurology was consulted and are thinking symptoms possibly due to stress reaction  Patient does complain of sleep deprivation, feeling of apprehension during sleep, dyspnea during sleep, not using CPAP at home      Plan  -MRI of brain was normal   -continue PT OT   -stop aspirin , heparin and statin  -swallow study showed no difficulty swallowing   -monitor frequent neuro checks
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
## 2020-08-13 NOTE — PLAN OF CARE
Problem: PHYSICAL THERAPY ADULT  Goal: Performs mobility at highest level of function for planned discharge setting  See evaluation for individualized goals  Description: Treatment/Interventions: Elevations, Therapeutic exercise, Patient/family training, Gait training, Spoke to nursing, OT          See flowsheet documentation for full assessment, interventions and recommendations  Note: Prognosis: Excellent  Problem List: Impaired balance, Obesity, Impaired vision  Assessment: Pt is 26 y o  male seen for PT evaluation s/p admit to [facility] on 8/12/2020 w/ Stroke-like symptoms  PT consulted to assess pt's functional mobility and d/c needs  Order placed for PT eval and tx, w/ up and OOB as tolerated order  Performed at least 2 patient identifiers during session: Name and wristband  Comorbidities affecting pt's physical performance at time of assessment include: tobacco abuse, morbid obesity, essential hypertension  PTA, pt was independent w/ all functional mobility w/ no AD, ambulates unrestricted distances and all terrain, has 10 [address], lives w/ significant other in two level house and on disability  Personal factors affecting pt at time of IE include: stairs to enter home, unable to perform dynamic tasks in community, visual impairments and inability to perform IADLs  Please find objective findings from PT assessment regarding body systems outlined above with impairments and limitations including impaired balance, gait deviations and fall risk  The following objective measures performed on IE also reveal limitations: Barthel Index: 75/100 and Modified [place]: 2 (slight disability)  Pt's clinical presentation is currently stable  Pt to benefit from continued PT tx to address deficits as defined above and maximize level of functional independent mobility and consistency   From PT/mobility standpoint, recommendation at time of d/c would be OP PT and home with family support pending progress in order to facilitate return to PLOF  Barriers to Discharge: None     PT Discharge Recommendation: Home with skilled therapy, Return to previous environment with social support(OP PT and home with family support)     PT - OK to Discharge: Yes(when medically cleared)    See flowsheet documentation for full assessment

## 2020-08-13 NOTE — DISCHARGE INSTR - AVS FIRST PAGE
DISCHARGE INSTRUCTIONS FROM HOSPITALIST   1  Follow up with Dr Anny Vieyra in one week  in regards to recent hospitalization and also regarding your sleep deprivation  2  Take medications regularly    Medication stopped:-  Naproxen    3  Come back to the ER if symptoms persistent weakness, numbness, tingling of arms/legs/face, difficulty speaking, difficulty swelling, persistent blurry vision, lightheadedness recur or worsen   4  Activity as tolerated  5  Diet : cardiac healthy   6  You are strongly advised to lose weight with healthy diet and regular exercise

## 2020-08-13 NOTE — PROGRESS NOTES
Progress Note - Nadia Wick 1990, 26 y o  male MRN: [id number]    Unit/Bed#: -01 Encounter: [id number]    Primary Care Provider: Kenneth Rajput MD   Date and time admitted to hospital: 8/12/2020  3:56 PM        * Stroke-like symptoms  Assessment & Plan  Left face, arm and leg weakness rule out stroke  Patient with H/o left-sided face, arm and leg weakness  Patient also complains of B/L blurry vision, lightheadedness, slurry speech started intermittently followed by constant since 3 weeks  H/o stroke in brother in 35s  All labs including troponin x3, EKG, CTA head and neck, MRI brain, lipid panel, PT/PTT are normal   Neurology was consulted and are thinking symptoms possibly due to stress reaction  Patient does complain of sleep deprivation, feeling of apprehension during sleep, dyspnea during sleep, not using CPAP at home  Plan  -MRI of brain was normal   -continue PT OT  -stop aspirin , heparin and statin  -swallow study showed no difficulty swallowing   -monitor frequent neuro checks    MATIAS (obstructive sleep apnea)  Assessment & Plan  Patient complains of difficulty falling asleep  Waking up during middle of the night with feeling of impending death and dyspnea  Increasing daytime sleepiness  Not using CPAP at home  H/o obesity with BMI 46    Plan  -educated regarding proper sleeping position  -encouraged to lose weight with proper diet and regular exercise      Morbid obesity ([address] )  Assessment & Plan  Encourage patient regarding healthy diet and regular exercise    Essential hypertension  Assessment & Plan  Current blood pressure is stable   -continue amlodipine 5 mg daily        VTE Pharmacologic Prophylaxis:   Pharmacologic: Heparin  Mechanical VTE Prophylaxis in Place: Yes    Discussions with Specialists or Other Care Team Provider:  Discussed with neurology is regarding possible cause     Education and Discussions with Family / Patient:  Discussed with patient regarding his current situation    Current Length of Stay: 0 day(s)    Current Patient Status: Observation     Discharge Plan / Estimated Discharge Date:  2020    Code Status: Level 1 - Full Code      Subjective:   Patient seen and examined by me at bedside  He was sitting on sofa  He communicated clearly  No particular overnight events reported  Patient stated improvement in weakness, numbness and tingling  On asking further he stated difficulty in falling asleep and dyspnea during sleeping with excessive daytime sleepiness  He still complains of blurry vision, dizziness and photophobia  Denies any difficulty speaking, difficulty swelling, difficulty closing eyes, chest pain, shortness of breath, nausea, vomiting, fever  Objective:     Vitals:   Temp (24hrs), Av °F (36 7 °C), Min:97 6 °F (36 4 °C), Max:98 3 °F (36 8 °C)    Temp:  [97 6 °F (36 4 °C)-98 3 °F (36 8 °C)] 98 1 °F (36 7 °C)  HR:  [75-93] 80  Resp:  [12-18] 16  BP: (113-150)/(60-96) 113/60  SpO2:  [93 %-97 %] 96 %  Body mass index is 46 59 kg/m²  Input and Output Summary (last 24 hours): Intake/Output Summary (Last 24 hours) at 2020 1732  Last data filed at 2020 0352  Gross per 24 hour   Intake 720 ml   Output 450 ml   Net 270 ml       Physical Exam:     Physical Exam  Vitals signs reviewed  Constitutional:       General: He is not in acute distress  Appearance: He is well-developed  HENT:      Head: Normocephalic and atraumatic  Neck:      Musculoskeletal: Normal range of motion and neck supple  Cardiovascular:      Rate and Rhythm: Normal rate and regular rhythm  Pulses: Normal pulses  Heart sounds: Normal heart sounds  Pulmonary:      Effort: Pulmonary effort is normal       Breath sounds: Normal breath sounds  Chest:      Chest wall: No tenderness  Abdominal:      General: Bowel sounds are normal  There is no distension  Palpations: Abdomen is soft  Tenderness: There is no abdominal tenderness  Musculoskeletal: Normal range of motion  General: No swelling or tenderness  Skin:     General: Skin is warm  Coloration: Skin is not pale  Neurological:      General: No focal deficit present  Mental Status: He is alert and oriented to person, place, and time  Sensory: Sensation is intact  Motor: Motor function is intact  No weakness, tremor or pronator drift  Coordination: Coordination is intact  Comments: Mild weakness present on left upper and lower extremity  Dizziness   Psychiatric:         Mood and Affect: Mood normal          Speech: He is communicative  Behavior: Behavior normal  Behavior is not agitated  Thought Content: Thought content normal          Judgment: Judgment normal          Additional Data:     Labs:    Results from last 7 days   Lab Units 08/12/20  1643   WBC Thousand/uL 6 42   HEMOGLOBIN g/dL 16 0   HEMATOCRIT % 48 0   PLATELETS Thousands/uL 219     Results from last 7 days   Lab Units 08/12/20  1643   POTASSIUM mmol/L 4 0   CHLORIDE mmol/L 105   CO2 mmol/L 30   BUN mg/dL 11   CREATININE mg/dL 0 90   CALCIUM mg/dL 9 1     Results from last 7 days   Lab Units 08/12/20  1643   INR  1 01       * I Have Reviewed All Lab Data Listed Above  * Additional Pertinent Lab Tests Reviewed:  [address] Admission Reviewed    Imaging:    Imaging Reports Reviewed Today Include:  CTA head and neck  Imaging Personally Reviewed by Myself Includes:  None    Recent Cultures (last 7 days):           Last 24 Hours Medication List:   Current Facility-Administered Medications   Medication Dose Route Frequency Provider Last Rate    amLODIPine  5 mg Oral Daily Izabella Charles MD      aspirin  81 mg Oral Daily Izabella Charles MD      atorvastatin  40 mg Oral QPM Izabella Charles MD      heparin (porcine)  5,000 Units Subcutaneous [facility] Izabella Charles MD      nicotine  1 patch Transdermal Daily Izabella Charles MD      ondansetron  4 mg Intravenous Q6H PRN Lucas Pereyra MD          Today, Patient Was Seen By: Schuyler Lesch, MD    ** Please Note: This note has been constructed using a voice recognition system   **

## 2020-08-13 NOTE — ASSESSMENT & PLAN NOTE
Patient complains of difficulty falling asleep  Waking up during middle of the night with feeling of impending death and dyspnea  Increasing daytime sleepiness  Not using CPAP at home  H/o obesity with BMI 46    Plan  -educated regarding proper sleeping position  -encouraged to lose weight with proper diet and regular exercise

## 2020-08-13 NOTE — UTILIZATION REVIEW
Initial Clinical Review    Admission: Date/Time/Statement:   Admission Orders (From admission, onward)     Ordered        08/12/20 1832  Place in Observation  Once         08/12/20 1836  Place in Observation  Once                   Orders Placed This Encounter   Procedures    Place in Observation     Standing Status:   Standing     Number of Occurrences:   1     Order Specific Question:   Admitting Physician     Answer:   Pascale Mckeon [[id number]]     Order Specific Question:   Level of Care     Answer:   Med Surg [16]    Place in Observation     Standing Status:   Standing     Number of Occurrences:   1     Order Specific Question:   Admitting Physician     Answer:   Pascale Mckeon [[id number]]     Order Specific Question:   Level of Care     Answer:   Med Surg [16]     ED Arrival Information     Expected Arrival Acuity Means of Arrival Escorted By Service Admission Type    - 8/12/2020 15:56 Urgent Ambulance [facility] General Medicine Urgent    Arrival Complaint    arm weakness        Chief Complaint   Patient presents with    Weakness - Generalized     states he has left sided weakness and dizziness for 2 weeks  put on BP meds 2 weeks ago and had similar symptoms with no improvement  Assessment/Plan: 25 yo male presents to ED with left arm, leg, facial weakness for 2 1/2 weeks  He saw PCP and was started on BP meds and instructed to f/u with Neuro  He also notes intermittent stuttering of speech & issues finding words,  also notes lightheadedness in that same time frame and bilaterally blurred vision  Hx of brother and uncle who had strokes and his brother was in his 35s  On exam: subjectively decreased sensation to the left upper and lower extremity with intact sensation to sharp and dull touch  Patient has mildly decreased motor strength of the upper and lower extremity but no drift  CBC & BMP nl  No acute findings on CTA Head/Neck  Admitted to Observation with  Left sided weakness r/o Stroke   Plan: Tele, Neuro checks, Consult Neuro, ASA, statin, Continue his antihypertensive medication with hold parameter,  PT/OT/Speech    8/13 Per Neuro: Dizziness/lightheadedness/ambulatory dysfunction/L sided weakness and numbness  Concern for possible small vessel stroke  MRI pending  Continue stroke pathway, ASA, statin, antihypertensives      ED Triage Vitals   Temperature Pulse Respirations Blood Pressure SpO2   08/12/20 1558 08/12/20 1558 08/12/20 1558 08/12/20 1558 08/12/20 1558   98 5 °F (36 9 °C) 90 16 162/86 97 %      Temp Source Heart Rate Source Patient Position - Orthostatic VS BP Location FiO2 (%)   08/12/20 1558 08/12/20 1558 08/12/20 1955 08/12/20 1558 --   Oral Monitor Lying Right arm       Pain Score       08/12/20 1955       No Pain          Wt Readings from Last 1 Encounters:   08/13/20 (!) 165 kg (362 lb 14 oz)     Additional Vital Signs:   08/13/20 0700   98 °F (36 7 °C)   86   18   149/94      94 %   None (Room air)   Lying    08/13/20 0500   97 9 °F (36 6 °C)   93   16   119/77      94 %   None (Room air)   Lying    08/13/20 02:58:49   97 6 °F (36 4 °C)   84   18   113/77   89   95 %   None (Room air)   Lying    08/13/20 0100   97 7 °F (36 5 °C)   75   16   117/85      93 %   None (Room air)   Lying    08/12/20 2300   97 9 °F (36 6 °C)   75   16   146/90      96 %   None (Room air)   Lying    08/12/20 2200   98 1 °F (36 7 °C)   85   16   144/86   105   95 %   None (Room air)   Lying    08/12/20 2100   98 1 °F (36 7 °C)   84   16   141/86   104   93 %   None (Room air)   Lying    08/12/20 1955   98 3 °F (36 8 °C)   83   16   137/82      96 %   None (Room air)   Lying    08/12/20 1845      82   15   119/77      95 %   None (Room air)       08/12/20 1745      78   12   129/74   95   97 %   None (Room air)       08/12/20 1645      84   19   119/69      97 %   None (Room air)          Pertinent Labs/Diagnostic Test Results:   Results from last 7 days   Lab Units 08/12/20  1643   SARS-COV-2  Negative Results from last 7 days   Lab Units 08/12/20  1643   WBC Thousand/uL 6 42   HEMOGLOBIN g/dL 16 0   HEMATOCRIT % 48 0   PLATELETS Thousands/uL 219     Results from last 7 days   Lab Units 08/12/20  1643   SODIUM mmol/L 141   POTASSIUM mmol/L 4 0   CHLORIDE mmol/L 105   CO2 mmol/L 30   ANION GAP mmol/L 6   BUN mg/dL 11   CREATININE mg/dL 0 90   EGFR ml/min/1 73sq m 114   CALCIUM mg/dL 9 1     Results from last 7 days   Lab Units 08/12/20  1600   POC GLUCOSE mg/dl 117     Results from last 7 days   Lab Units 08/12/20  1643   GLUCOSE RANDOM mg/dL 96     Results from last 7 days   Lab Units 08/13/20  0525   HEMOGLOBIN A1C % 5 7*   EAG mg/dl 117     Results from last 7 days   Lab Units 08/12/20  1643   TROPONIN I ng/mL <0 02     Results from last 7 days   Lab Units 08/12/20  1643   PROTIME seconds 12 8   INR  1 01   PTT seconds 27     Results from last 7 days   Lab Units 08/13/20  0350   CLARITY UA  Clear   COLOR UA  Yellow   SPEC GRAV UA  1 020   PH UA  6 5   GLUCOSE UA mg/dl Negative   KETONES UA mg/dl Negative   BLOOD UA  Negative   PROTEIN UA mg/dl Negative   NITRITE UA  Negative   BILIRUBIN UA  Negative   UROBILINOGEN UA E U /dl 1 0   LEUKOCYTES UA  Negative     8/12 CTA Head & Neck: 1  No evidence of acute intracranial hemorrhage  2  No evidence of hemodynamic significant stenosis, aneurysm or dissection  If persistent focal neurologic deficit, consider follow-up noncontrast brain MRI  8/12 EKG: NSR    8/13 ECHO: LEFT VENTRICLE:  Systolic function was normal  Ejection fraction was estimated to be 60 %  There were no regional wall motion abnormalities  Wall thickness was mildly increased  There was mild concentric hypertrophy    Left ventricular diastolic function parameters were normal      RIGHT VENTRICLE:  The size was normal   Systolic function was normal      ATRIAL SEPTUM:  There was no left-to-right shunt and no right-to-left shunt by bubble study      TRICUSPID VALVE:  There was trace regurgitation  Pulmonary artery systolic pressure was within the normal range      PULMONIC VALVE:  There was trace regurgitation  MRI  Pending    ED Treatment:   Medication Administration from 08/12/2020 1556 to 08/12/2020 1914       Date/Time Order Dose Route Action     08/12/2020 1853 aspirin tablet 325 mg 325 mg Oral Given        Past Medical History:   Diagnosis Date    Hypertension      Present on Admission:   Arm weakness   Essential hypertension   Morbid obesity (HCC)   Tobacco abuse      Admitting Diagnosis: Arm weakness [R29 898]  Age/Sex: 26 y o  male     Admission Orders:  Scheduled Medications:  amLODIPine, 5 mg, Oral, Daily  aspirin, 81 mg, Oral, Daily  atorvastatin, 40 mg, Oral, QPM  heparin (porcine), 5,000 Units, Subcutaneous, Q8H [address]  nicotine, 1 patch, Transdermal, Daily      Continuous IV Infusions:     PRN Meds:  ondansetron, 4 mg, Intravenous, Q6H PRN    TELE  Neuro checks q1h x 4, q2h x 8, q4h  SCDs  IP CONSULT TO NEUROLOGY  IP CONSULT TO CASE MANAGEMENT  IP CONSULT TO NUTRITION SERVICES    Network Utilization Review Department  [email or web address]  ATTENTION: Please call with any questions or concerns to [phone] and carefully listen to the prompts so that you are directed to the right person  All voicemails are confidential   Madonna Grand all requests for admission clinical reviews, approved or denied determinations and any other requests to dedicated fax number below belonging to the campus where the patient is receiving treatment   List of dedicated fax numbers for the Facilities:  FACILITY NAME UR FAX NUMBER   ADMISSION DENIALS (Administrative/Medical Necessity) [phone] [address] (Maternity/NICU/P[facility]s) [phone]   Angelica Winter [phone]   Chiara Kearns [phone]   [place] Form [phone]   Alondratariq Emi [facility] [phone]   Catrachita Davidson [facility] [phone]   [facility] Cr[facility] [phone]   [facility] HEART [place] [phone]   [address] [place] [address] [phone]

## 2020-08-13 NOTE — PHYSICAL THERAPY NOTE
Physical Therapy Evaluation     Patient's Name: Silke Lisa    Admitting Diagnosis  Arm weakness [R29 898]    Problem List  Patient Active Problem List   Diagnosis    Stroke-like symptoms    Essential hypertension    Morbid obesity ([address] )    Tobacco abuse       Past Medical History  Past Medical History:   Diagnosis Date    Hypertension        Past Surgical History  Past Surgical History:   Procedure Laterality Date    SHOULDER SURGERY            08/13/20 1154   Note Type   Note type Eval only   Pain Assessment   Pain Assessment Tool Pain Assessment not indicated - pt denies pain   Pain Score No Pain   Home Living   Type of Home House   Home Layout Performs ADLs on one level; Able to live on main level with bedroom/bathroom; Other (Comment); Stairs to enter with rails; Two level  (lives on the second level; enters at basement level; 10 [address])   Bathroom Shower/Tub Tub/shower unit   [address] bars in [facility] Other (Comment)  (no DME/AD)   Prior Function   Level of [place] Independent with ADLs and functional mobility; Needs assistance with IADLs   Lives With Significant other   Receives Help From Other (Comment)  (Significant other)   ADL Assistance Independent   IADLs Needs assistance   Falls in the last 6 months 0   Vocational On disability   Comments pt no longer drives   Restrictions/Precautions   Weight Bearing Precautions Per Order No   Other Precautions Telemetry; Fall Risk;Visual impairment  ("blurry vision")   General   Family/Caregiver Present No   Cognition   Overall Cognitive Status WFL   Arousal/Participation Cooperative   Orientation Level Oriented X4   Memory Within functional limits   Following Commands Follows all commands and directions without difficulty   Comments pt agreeable to PT eval   RUE Assessment   RUE Assessment WFL   LUE Assessment   LUE Assessment WFL   RLE Assessment   RLE Assessment WFL  (4/5)   LLE Assessment   LLE Assessment WFL  (4/5)   Coordination   Movements are Fluid and Coordinated 1   Sensation WFL   Light Touch   RLE Light Touch Grossly intact   LLE Light Touch Grossly intact   Bed Mobility   Additional Comments pt seated on bedside sofa upon arrival   Transfers   Sit to Stand 6  Modified independent   Stand to Sit 6  Modified independent   Ambulation/Elevation   Gait pattern Wide LILIYA; Improper Weight shift   Gait Assistance 5  Supervision   Additional items Assist x 1;Verbal cues   Assistive Device None   Distance 60'   Stair Management Assistance Not tested  (pt declined stair training trial; reports no concerns at this time)   Balance   Static Sitting Normal   Dynamic Sitting Normal   Static Standing Good   Dynamic Standing Good   Ambulatory Fair +   Endurance Deficit   Endurance Deficit No   Activity Tolerance   Activity Tolerance Patient tolerated treatment well   Medical Staff Made Aware LAURENT Tavares   Nurse Made Aware RN Karlie confirmed pt appropriate for therapy   Assessment   Prognosis Excellent   Problem List Impaired balance;Obesity; Impaired vision   Assessment Pt is 26 y o  male seen for PT evaluation s/p admit to [facility] 8/12/2020 w/ Stroke-like symptoms  PT consulted to assess pt's functional mobility and d/c needs  Order placed for PT eval and tx, w/ up and OOB as tolerated order  Performed at least 2 patient identifiers during session: Name and wristband  Comorbidities affecting pt's physical performance at time of assessment include: tobacco abuse, morbid obesity, essential hypertension  PTA, pt was independent w/ all functional mobility w/ no AD, ambulates unrestricted distances and all terrain, has [address], lives w/ significant other in two level house and on disability  Personal factors affecting pt at time of IE include: stairs to enter home, unable to perform dynamic tasks in community, visual impairments and inability to perform IADLs   Please find objective findings from PT assessment regarding body systems outlined above with impairments and limitations including impaired balance, gait deviations and fall risk  The following objective measures performed on IE also reveal limitations: Barthel Index: 75/100 and Modified Love: 2 (slight disability)  Pt's clinical presentation is currently stable  Pt to benefit from continued PT tx to address deficits as defined above and maximize level of functional independent mobility and consistency  From PT/mobility standpoint, recommendation at time of d/c would be OP PT and home with family support pending progress in order to facilitate return to PLOF  Barriers to Discharge None   Goals   Patient Goals "to determine the cause of these episodes"   [facility] Expiration Date 08/20/20   Short Term Goal #1 In 7-10 days: Ambulate > 150 ft  without AD independently w/o LOB and w/ normalized gait pattern 100% of the time, Navigate 10 stairs modified independent with unilateral handrail to facilitate return to previous living environment, Increase ambulatory balance 1/2 grade to decrease risk for falls and Complete exercise program independently   PT Treatment Day 0   Plan   Treatment/Interventions Elevations; Therapeutic exercise;Patient/family training;Gait training;Spoke to nursing;OT   PT Frequency Follow-up visit only   Recommendation   PT Discharge Recommendation Home with skilled therapy; Return to previous environment with social support  (OP PT and home with family support)   PT - OK to Discharge Yes  (when medically cleared)   Modified Rockford Scale   Modified Rockford Scale 2   Barthel Index   Feeding 10   Bathing 5   Grooming Score 5   Dressing Score 10   Bladder Score 10   Bowels Score 10   Toilet Use Score 10   Transfers (Bed/Chair) Score 15   Mobility (Level Surface) Score 0   Stairs Score 0   Barthel Index Score 75         Michael Herbert, PT, DPT

## 2020-08-13 NOTE — SOCIAL WORK
LOS 23 HOURS  RISK OF UNPLANNED READMISSION SCORE N/A  30 DAY READMISSION: NO  BUNDLE: NO    CM met with patient bedside  Patient live in a bi-level home with [address] w/ wife  Patient lives on the second floor and has one flight of stairs to get to second level where he complete all ADLs  Patient denied any DME use, however states there are grab bars in the shower  No Hx of VNA, STR, and MH identified during this assessment  Patient smokes 1/2 PPD and smokes marijuana and did not disclose frequency of use  PCP: Ronna Allen    Preferred Pharmacy: [facility] [address] [place] barriers identified to obtaining Rx from that location  Patient recommended for OP PT, AVS updated and provided with list of OP PT locations  CM reviewed discharge planning process including the following: identifying help at home, patient preference for discharge planning needs, pharmacy preference, and availability of treatment team to discuss questions or concerns patient and/or family may have regarding understanding medications and recognizing signs and symptoms once discharged  CM also encouraged patient to follow up with all recommended appointments after discharge  Patient advised of importance for patient and family to participate in managing patients medical well being  CM name and role reviewed  Discharge Checklist reviewed and CM will continue to monitor for progress toward discharge goals in nursing and provider rounds  Patient relies on wife for transportation to medical appointments  Patient is currently on SSD  Patient's wife will transport home when cleared for discharge

## 2020-08-13 NOTE — SPEECH THERAPY NOTE
Consult received and chart reviewed  Per chart review, and discussion with RN, pt passed RN dysphagia assessment and is tolerating regular diet with thin liquids with no s/s of aspiration  No dysarthria or language deficits noted or reported  Therefore, formal speech/swallow evaluation not indicated at this time  If new concerns arise, please reconsult       PORSCHE Tyson , Todd  Pathologist   [phone]

## 2020-08-13 NOTE — PLAN OF CARE
Problem: Potential for Falls  Goal: Patient will remain free of falls  Description: INTERVENTIONS:  - Assess patient frequently for physical needs  -  Identify cognitive and physical deficits and behaviors that affect risk of falls    -  [place] fall precautions as indicated by assessment   - Educate patient/family on patient safety including physical limitations  - Instruct patient to call for assistance with activity based on assessment  - Modify environment to reduce risk of injury  - Consider OT/PT consult to assist with strengthening/mobility  Outcome: Progressing     Problem: PAIN - ADULT  Goal: Verbalizes/displays adequate comfort level or baseline comfort level  Description: Interventions:  - Encourage patient to monitor pain and request assistance  - Assess pain using appropriate pain scale  - Administer analgesics based on type and severity of pain and evaluate response  - Implement non-pharmacological measures as appropriate and evaluate response  - Consider cultural and social influences on pain and pain management  - Notify physician/advanced practitioner if interventions unsuccessful or patient reports new pain  Outcome: Progressing     Problem: INFECTION - ADULT  Goal: Absence or prevention of progression during hospitalization  Description: INTERVENTIONS:  - Assess and monitor for signs and symptoms of infection  - Monitor lab/diagnostic results  - Monitor all insertion sites, i e  indwelling lines, tubes, and drains  - Monitor endotracheal if appropriate and nasal secretions for changes in amount and color  - [place] appropriate cooling/warming therapies per order  - Administer medications as ordered  - Instruct and encourage patient and family to use good hand hygiene technique  - Identify and instruct in appropriate isolation precautions for identified infection/condition  Outcome: Progressing     Problem: SAFETY ADULT  Goal: Patient will remain free of falls  Description: INTERVENTIONS:  - Assess patient frequently for physical needs  -  Identify cognitive and physical deficits and behaviors that affect risk of falls    -  [place] fall precautions as indicated by assessment   - Educate patient/family on patient safety including physical limitations  - Instruct patient to call for assistance with activity based on assessment  - Modify environment to reduce risk of injury  - Consider OT/PT consult to assist with strengthening/mobility  Outcome: Progressing  Goal: Maintain or return to baseline ADL function  Description: INTERVENTIONS:  -  Assess patient's ability to carry out ADLs; assess patient's baseline for ADL function and identify physical deficits which impact ability to perform ADLs (bathing, care of mouth/teeth, toileting, grooming, dressing, etc )  - Assess/evaluate cause of self-care deficits   - Assess range of motion  - Assess patient's mobility; develop plan if impaired  - Assess patient's need for assistive devices and provide as appropriate  - Encourage maximum independence but intervene and supervise when necessary  - Involve family in performance of ADLs  - Assess for home care needs following discharge   - Consider OT consult to assist with ADL evaluation and planning for discharge  - Provide patient education as appropriate  Outcome: Progressing  Goal: Maintain or return mobility status to optimal level  Description: INTERVENTIONS:  - Assess patient's baseline mobility status (ambulation, transfers, stairs, etc )    - Identify cognitive and physical deficits and behaviors that affect mobility  - Identify mobility aids required to assist with transfers and/or ambulation (gait belt, sit-to-stand, lift, walker, cane, etc )  - [place] fall precautions as indicated by assessment  - Record patient progress and toleration of activity level on Mobility SBAR; progress patient to next Phase/Stage  - Instruct patient to call for assistance with activity based on assessment  - Consider rehabilitation consult to assist with strengthening/weightbearing, etc   Outcome: Progressing     Problem: DISCHARGE PLANNING  Goal: Discharge to home or other facility with appropriate resources  Description: INTERVENTIONS:  - Identify barriers to discharge w/patient and caregiver  - Arrange for needed discharge resources and transportation as appropriate  - Identify discharge learning needs (meds, wound care, etc )  - Arrange for interpretive services to assist at discharge as needed  - Refer to Case Management Department for coordinating discharge planning if the patient needs post-hospital services based on physician/advanced practitioner order or complex needs related to functional status, cognitive ability, or social support system  Outcome: Progressing     Problem: Knowledge Deficit  Goal: Patient/family/caregiver demonstrates understanding of disease process, treatment plan, medications, and discharge instructions  Description: Complete learning assessment and assess knowledge base  Interventions:  - Provide teaching at level of understanding  - Provide teaching via preferred learning methods  Outcome: Progressing     Problem: Neurological Deficit  Goal: Neurological status is stable or improving  Description: Interventions:  - Monitor and assess patient's level of consciousness, motor function, sensory function, and level of assistance needed for ADLs  - Monitor and report changes from baseline  Collaborate with interdisciplinary team to initiate plan and implement interventions as ordered  - Provide and maintain a safe environment  - Consider seizure precautions  - Consider fall precautions  - Consider aspiration precautions  - Consider bleeding precautions  Outcome: Progressing     Problem:  Activity Intolerance/Impaired Mobility  Goal: Mobility/activity is maintained at optimum level for patient  Description: Interventions:  - Assess and monitor patient  barriers to mobility and need for assistive/adaptive devices  - Assess patient's emotional response to limitations  - Collaborate with interdisciplinary team and initiate plans and interventions as ordered  - Encourage independent activity per ability   - Maintain proper body alignment  - Perform active/passive rom as tolerated/ordered  - Plan activities to conserve energy   - Turn patient as appropriate  Outcome: Progressing     Problem: Communication Impairment  Goal: Ability to express needs and understand communication  Description: Assess patient's communication skills and ability to understand information  Patient will demonstrate use of effective communication techniques, alternative methods of communication and understanding even if not able to speak  - Encourage communication and provide alternate methods of communication as needed  - Collaborate with case management/ for discharge needs  - Include patient/family/caregiver in decisions related to communication  Outcome: Progressing     Problem: Potential for Aspiration  Goal: Non-ventilated patient's risk of aspiration is minimized  Description: Assess and monitor vital signs, respiratory status, and labs (WBC)  Monitor for signs of aspiration (tachypnea, cough, rales, wheezing, cyanosis, fever)  - Assess and monitor patient's ability to swallow  - Place patient up in chair to eat if possible  - HOB up at 90 degrees to eat if unable to get patient up into chair   - Supervise patient during oral intake  - Instruct patient/ family to take small bites  - Instruct patient/ family to take small single sips when taking liquids    - Follow patient-specific strategies generated by speech pathologist   Outcome: Progressing     Problem: Nutrition  Goal: Nutrition/Hydration status is improving  Description: Monitor and assess patient's nutrition/hydration status for malnutrition (ex- brittle hair, bruises, dry skin, pale skin and conjunctiva, muscle wasting, smooth red tongue, and disorientation)  Collaborate with interdisciplinary team and initiate plan and interventions as ordered  Monitor patient's weight and dietary intake as ordered or per policy  Utilize nutrition screening tool and intervene per policy  Determine patient's food preferences and provide high-protein, high-caloric foods as appropriate  - Assist patient with eating   - Allow adequate time for meals   - Encourage patient to take dietary supplement as ordered  - Collaborate with clinical nutritionist   - Include patient/family/caregiver in decisions related to nutrition    Outcome: Progressing     Problem: CARDIOVASCULAR - ADULT  Goal: Maintains optimal cardiac output and hemodynamic stability  Description: INTERVENTIONS:  - Monitor I/O, vital signs and rhythm  - Monitor for S/S and trends of decreased cardiac output  - Administer and titrate ordered vasoactive medications to optimize hemodynamic stability  - Assess quality of pulses, skin color and temperature  - Assess for signs of decreased coronary artery perfusion  - Instruct patient to report change in severity of symptoms  Outcome: Progressing  Goal: Absence of cardiac dysrhythmias or at baseline rhythm  Description: INTERVENTIONS:  - Continuous cardiac monitoring, vital signs, obtain 12 lead EKG if ordered  - Administer antiarrhythmic and heart rate control medications as ordered  - Monitor electrolytes and administer replacement therapy as ordered  Outcome: Progressing

## 2020-08-13 NOTE — PLAN OF CARE
Problem: OCCUPATIONAL THERAPY ADULT  Goal: Performs self-care activities at highest level of function for planned discharge setting  See evaluation for individualized goals  Description: Treatment Interventions: ADL retraining, Visual perceptual retraining, Functional transfer training, Patient/family training, Activityengagement          See flowsheet documentation for full assessment, interventions and recommendations  Note: Limitation: Decreased high-level ADLs, Decreased self-care trans, Visual deficit  Prognosis: Good  Assessment: Patient is a 26 y o  male seen for OT evaluation s/p admit to [id number] [place] [address] on 8/12/2020 w/Stroke-like symptoms  Commorbidities affecting patient's functional performance at time of assessment include: arm weakness, tobacco abuse, morbid obesity, and essential HTN  Orders placed for OT evaluation and treatment  Performed at least two patient identifiers during session including name and wristband  Prior to admission, patient was living with his significant other in a two-story home where he lives on the second floor  Patient reports that at baseline, he is independent in ADLs and requires assistance from his significant other for IADLs  Personal factors affecting patient at time of initial evaluation include: steps to enter and difficulty performing IADLs  Upon evaluation, patient requires modified independent assist for UB ADLs, supervision assist for LB ADLs, transfers and functional ambulation in room and bathroom with supervision assist, with no DME  Occupational performance is affected by the following deficits: dynamic sit/ stand balance deficit with poor standing tolerance time for self care and functional mobility and visual deficit   Therapist completed brief history review of medical records related to the presenting problem, clinical examination identifying  1-3 performance deficits, clinical decision making of low complexity , consistent with a  low complexity level evaluation  Patient to benefit from continued Occupational Therapy treatment while in the hospital to address deficits as defined above and maximize level of functional independence with ADLs and functional mobility  Occupational Performance areas to address include: grooming , bathing/ shower, dressing, toilet hygiene, transfer to all surfaces, functional mobility, community mobility, health maintenance, IADLS: Household maintenance, IADLs: safety procedures, Leisure Participation and Social participation  From OT standpoint, recommendation at time of d/c would be Outpatient OT, specifically outpatient vision therapy  OT Discharge Recommendation: Home with skilled therapy(Outpatient visual therapy)  OT - OK to Discharge:  Yes

## 2020-08-13 NOTE — CONSULTS
Consultation - Neurology   Magan Scanlon 26 y o  male MRN: [id number]  Unit/Bed#: -01 Encounter: [id number]    Assessment/Plan   Assessment:  26year old male with HTN admitted with complaints of dizziness, lightheadedness, ambulatory dysfunction and L sided weakness/numbness     Plan:  1  Dizziness/lightheadedness/ambulatory dysfunction/L sided weakness and numbness   - Continue on acute ischemic stroke pathway  Concern for possible small vessel stroke  - MRI brain without contrast pending     - CTA head and neck completed and no acute findings noted  - Echocardiogram pending     - Hemoglobin A1c pending     - Fasting lipid panel reviewed, total cholesterol 180, triglycerides 155, HDL 37,      - Continue on ASA 81 mg QD and atorvastatin 40 mg QPM     - Goal normothermia, euglycemia, and normotension     - PT/OT    - Stroke education     - Monitor exam and notify with changes  2  HTN    - Goal normotension     - Antihypertensives as per medicine team      History of Present Illness     Reason for Consult / Principal Problem: Stroke    HPI: Magan Scanlon is a 26 y o  left handed male with HTN who presents with 3 week history ofL sided weakness and numbness  Patient notes he "has not felt like himself" for several weeks  He notes he was seen in the ED previously and discharged  To review, patient was seen in the ED for dizziness and chest pain on August 4, 2020  EKG and troponin were negative and he was discharged to follow-up with PCP for blood pressure check  He returned to the ED on August 12, 2020 with complaints of L arm, leg and facial weakness  He notes that this has been present for 3 weeks and he feels as though his symptoms are worsening and this prompted him to come to the hospital  He notes that he has associated numbness on his left side as well as dizziness, lightheadedness, and ambulatory dysfunction  He also notes headaches  Denies neck pain, back pain   He notes that he has never had similar symptoms before in the past  He notes he has a longstanding history of hypertension but just recently started taking medication for this  He notes family history of stroke occurring in young age (brother in 35s, uncle in 46s)  He also notes that he woke up a few nights ago and was "tongue tied and unable to speak "     Consult to neurology  Consult performed by: Ashok Saeed PA-C  Consult ordered by: Billy Marinelli MD          Review of Systems   Constitutional: Negative for chills, fatigue and fever  HENT: Negative for trouble swallowing  Eyes: Positive for photophobia and visual disturbance  Respiratory: Negative for shortness of breath  Cardiovascular: Negative for chest pain  Gastrointestinal: Negative for abdominal pain, nausea and vomiting  Genitourinary: Positive for dysuria  Negative for difficulty urinating  Musculoskeletal: Positive for gait problem  Negative for back pain and neck pain  Skin: Negative for rash  Neurological: Positive for dizziness, facial asymmetry, speech difficulty, weakness, light-headedness, numbness and headaches  Psychiatric/Behavioral: Positive for confusion  Historical Information   Past Medical History:   Diagnosis Date    Hypertension      Past Surgical History:   Procedure Laterality Date    SHOULDER SURGERY       Social History   Social History     Substance and Sexual Activity   Alcohol Use Yes     Social History     Substance and Sexual Activity   Drug Use Yes    Types: Marijuana     E-Cigarette/Vaping     E-Cigarette/Vaping Substances     Social History     Tobacco Use   Smoking Status Current Every Day Smoker    Packs/day: 0 50   Smokeless Tobacco Never Used     Family History: History reviewed  No pertinent family history  Review of previous medical records was completed  Please see HPI       Meds/Allergies   Scheduled Meds:  Current Facility-Administered Medications   Medication Dose Route Frequency Provider Last Rate  amLODIPine  5 mg Oral Daily Aníbal Rodriguez MD      aspirin  81 mg Oral Daily Aníbal Rodriguez MD      atorvastatin  40 mg Oral QPM Aníbal Rodriguez MD      heparin (porcine)  5,000 Units Subcutaneous [facility] Aníbal Rodriguez MD      nicotine  1 patch Transdermal Daily Aníbal Rodriguez MD      ondansetron  4 mg Intravenous Q6H PRN Aníbal Rodriguez MD       Continuous Infusions:   PRN Meds: ondansetron      No Known Allergies    Objective   Vitals:Blood pressure 119/77, pulse 93, temperature 97 9 °F (36 6 °C), temperature source Oral, resp  rate 16, height 6' 2" (1 88 m), weight (!) 165 kg (362 lb 14 oz), SpO2 94 %  ,Body mass index is 46 59 kg/m²  Intake/Output Summary (Last 24 hours) at 8/13/2020 0827  Last data filed at 8/13/2020 0352  Gross per 24 hour   Intake 720 ml   Output 450 ml   Net 270 ml       Invasive Devices: Invasive Devices     Peripheral Intravenous Line            Peripheral IV 08/12/20 Right Antecubital less than 1 day                Physical Exam  Constitutional:       General: He is not in acute distress  Appearance: Normal appearance  He is obese  He is not ill-appearing, toxic-appearing or diaphoretic  HENT:      Mouth/Throat:      Mouth: Mucous membranes are moist       Pharynx: Oropharynx is clear  No oropharyngeal exudate or posterior oropharyngeal erythema  Eyes:      General: No scleral icterus  Right eye: No discharge  Left eye: No discharge  Extraocular Movements: Extraocular movements intact  Conjunctiva/sclera: Conjunctivae normal       Pupils: Pupils are equal, round, and reactive to light  Neck:      Musculoskeletal: Normal range of motion and neck supple  Cardiovascular:      Rate and Rhythm: Normal rate and regular rhythm  Pulmonary:      Effort: Pulmonary effort is normal  No respiratory distress  Breath sounds: Normal breath sounds  Abdominal:      General: There is no distension  Palpations: Abdomen is soft        Tenderness: There is no abdominal tenderness  Musculoskeletal: Normal range of motion  General: No swelling or tenderness  Right lower leg: No edema  Left lower leg: No edema  Skin:     General: Skin is warm and dry  Neurological:      Mental Status: He is alert and oriented to person, place, and time  Coordination: Finger-Nose-Finger Test normal       Deep Tendon Reflexes:      Reflex Scores:       Bicep reflexes are 1+ on the right side and 1+ on the left side  Brachioradialis reflexes are 1+ on the right side and 1+ on the left side  Patellar reflexes are 1+ on the right side and 1+ on the left side  Achilles reflexes are 0 on the right side and 0 on the left side  Psychiatric:         Mood and Affect: Mood normal          Speech: Speech normal          Behavior: Behavior normal        Neurologic Exam     Mental Status   Oriented to person, place, and time  Oriented to person  Oriented to place  Oriented to time  Attention: normal  Concentration: normal    Speech: speech is normal   Level of consciousness: alert  Knowledge: good  Normal comprehension  Able to complete simple calculations, spell word "world" forward and backward  Cranial Nerves     CN II   Right visual field deficit: none  Left visual field deficit: none     CN III, IV, VI   Pupils are equal, round, and reactive to light  Right pupil: Size: 4 mm  Shape: regular  Reactivity: brisk  Consensual response: intact  Left pupil: Size: 4 mm  Shape: regular  Reactivity: brisk  Consensual response: intact     Nystagmus: none   Diplopia: none  Ophthalmoparesis: none  Upgaze: normal  Downgaze: normal  Conjugate gaze: present    CN V   Right facial sensation deficit: none  Left facial sensation deficit: none    CN VII   Right facial weakness: none  Left facial weakness: none    CN VIII   Hearing: intact    CN IX, X   Palate: symmetric    CN XI   Right sternocleidomastoid strength: normal  Left sternocleidomastoid strength: normal    CN XII   Tongue: not atrophic  Fasciculations: absent  Tongue deviation: none    Motor Exam   Muscle bulk: normal  Overall muscle tone: normal  Right arm tone: normal  Left arm tone: normal  Right arm pronator drift: absent  Left arm pronator drift: present  Right leg tone: normal  Left leg tone: normalMotor strength RUE and RLE 5/5, LUE 4+/5, LLE 4/5  Sensory Exam   Right arm light touch: normal  Left arm light touch: Notes diminished on L when compared to R  Right leg light touch: normal  Left leg light touch: Notes diminished on L when compared to R  Right arm vibration: normal  Left arm vibration: Notes diminished on L when compared to R  Right leg vibration: normal  Left leg vibration: Notes diminished on L when compared to R      Gait, Coordination, and Reflexes     Gait  Gait: wide-based    Coordination   Finger to nose coordination: normal    Tremor   Resting tremor: absent  Intention tremor: absent  Action tremor: absent    Reflexes   Right brachioradialis: 1+  Left brachioradialis: 1+  Right biceps: 1+  Left biceps: 1+  Right patellar: 1+  Left patellar: 1+  Right achilles: 0  Left achilles: 0  Right plantar: normal  Left plantar: normal      Lab Results:   Recent Results (from the past 24 hour(s))   Fingerstick Glucose (POCT)    Collection Time: 08/12/20  4:00 PM   Result Value Ref Range    POC Glucose 117 65 - 140 mg/dl   Basic metabolic panel    Collection Time: 08/12/20  4:43 PM   Result Value Ref Range    Sodium 141 136 - 145 mmol/L    Potassium 4 0 3 5 - 5 3 mmol/L    Chloride 105 100 - 108 mmol/L    CO2 30 21 - 32 mmol/L    ANION GAP 6 4 - 13 mmol/L    BUN 11 5 - 25 mg/dL    Creatinine 0 90 0 60 - 1 30 mg/dL    Glucose 96 65 - 140 mg/dL    Calcium 9 1 8 3 - 10 1 mg/dL    eGFR 114 ml/min/1 73sq m   CBC and Platelet    Collection Time: 08/12/20  4:43 PM   Result Value Ref Range    WBC 6 42 4 31 - 10 16 Thousand/uL    RBC 5 13 3 88 - 5 62 Million/uL Hemoglobin 16 0 12 0 - 17 0 g/dL    Hematocrit 48 0 36 5 - 49 3 %    MCV 94 82 - 98 fL    MCH 31 2 26 8 - 34 3 pg    MCHC 33 3 31 4 - 37 4 g/dL    RDW 12 7 11 6 - 15 1 %    Platelets 955 450 - 593 Thousands/uL    MPV 10 7 8 9 - 12 7 fL   Protime-INR    Collection Time: 08/12/20  4:43 PM   Result Value Ref Range    Protime 12 8 11 6 - 14 5 seconds    INR 1 01 0 84 - 1 19   APTT    Collection Time: 08/12/20  4:43 PM   Result Value Ref Range    PTT 27 23 - 37 seconds   Troponin I    Collection Time: 08/12/20  4:43 PM   Result Value Ref Range    Troponin I <0 02 <=0 04 ng/mL   Novel Coronavirus (Covid-19),PCR [facility]    Collection Time: 08/12/20  4:43 PM    Specimen: Nose; Nares   Result Value Ref Range    SARS-CoV-2 Negative Negative   UA (URINE) with reflex to Scope    Collection Time: 08/13/20  3:50 AM   Result Value Ref Range    Color, UA Yellow     Clarity, UA Clear     Specific Gravity, UA 1 020 1 003 - 1 030    pH, UA 6 5 4 5, 5 0, 5 5, 6 0, 6 5, 7 0, 7 5, 8 0    Leukocytes, UA Negative Negative    Nitrite, UA Negative Negative    Protein, UA Negative Negative mg/dl    Glucose, UA Negative Negative mg/dl    Ketones, UA Negative Negative mg/dl    Urobilinogen, UA 1 0 0 2, 1 0 E U /dl E U /dl    Bilirubin, UA Negative Negative    Blood, UA Negative Negative   Lipid Panel with Direct LDL reflex    Collection Time: 08/13/20  5:25 AM   Result Value Ref Range    Cholesterol 180 50 - 200 mg/dL    Triglycerides 155 (H) <=150 mg/dL    HDL, Direct 37 (L) >=40 mg/dL    LDL Calculated 112 (H) 0 - 100 mg/dL       Imaging Studies: I have personally reviewed pertinent reports  and I have personally reviewed pertinent films in PACS  CTA head and neck with and without contrast- No evidence of acute intracranial hemorrhage  No evidence of hemodynamic significant stenosis, aneurysm, or dissection       VTE Prophylaxis: Heparin

## 2020-08-14 VITALS
TEMPERATURE: 98.1 F | OXYGEN SATURATION: 96 % | HEART RATE: 78 BPM | HEIGHT: 74 IN | BODY MASS INDEX: 40.43 KG/M2 | DIASTOLIC BLOOD PRESSURE: 74 MMHG | RESPIRATION RATE: 16 BRPM | WEIGHT: 315 LBS | SYSTOLIC BLOOD PRESSURE: 138 MMHG

## 2020-08-14 PROCEDURE — 99217 PR OBSERVATION CARE DISCHARGE MANAGEMENT: CPT | Performed by: STUDENT IN AN ORGANIZED HEALTH CARE EDUCATION/TRAINING PROGRAM

## 2020-08-14 PROCEDURE — 99225 PR SBSQ OBSERVATION CARE/DAY 25 MINUTES: CPT | Performed by: PSYCHIATRY & NEUROLOGY

## 2020-08-14 RX ORDER — MAGNESIUM SULFATE 1 G/100ML
1 INJECTION INTRAVENOUS ONCE
Status: COMPLETED | OUTPATIENT
Start: 2020-08-14 | End: 2020-08-14

## 2020-08-14 RX ORDER — NICOTINE 21 MG/24HR
14 PATCH, TRANSDERMAL 24 HOURS TRANSDERMAL DAILY
Status: DISCONTINUED | OUTPATIENT
Start: 2020-08-14 | End: 2020-08-14

## 2020-08-14 RX ORDER — ACETAMINOPHEN 325 MG/1
650 TABLET ORAL EVERY 6 HOURS PRN
Status: DISCONTINUED | OUTPATIENT
Start: 2020-08-14 | End: 2020-08-14 | Stop reason: HOSPADM

## 2020-08-14 RX ADMIN — ACETAMINOPHEN 650 MG: 325 TABLET, FILM COATED ORAL at 08:22

## 2020-08-14 RX ADMIN — MAGNESIUM SULFATE HEPTAHYDRATE 1 G: 1 INJECTION, SOLUTION INTRAVENOUS at 11:12

## 2020-08-14 RX ADMIN — AMLODIPINE BESYLATE 5 MG: 5 TABLET ORAL at 08:22

## 2020-08-14 RX ADMIN — HEPARIN SODIUM 5000 UNITS: 5000 INJECTION INTRAVENOUS; SUBCUTANEOUS at 06:04

## 2020-08-14 RX ADMIN — ASPIRIN 81 MG 81 MG: 81 TABLET ORAL at 08:22

## 2020-08-14 NOTE — PLAN OF CARE
Problem: Potential for Falls  Goal: Patient will remain free of falls  Description: INTERVENTIONS:  - Assess patient frequently for physical needs  -  Identify cognitive and physical deficits and behaviors that affect risk of falls    -  [place] fall precautions as indicated by assessment   - Educate patient/family on patient safety including physical limitations  - Instruct patient to call for assistance with activity based on assessment  - Modify environment to reduce risk of injury  - Consider OT/PT consult to assist with strengthening/mobility  8/14/2020 1314 by Chandan Lopez RN  Outcome: Adequate for Discharge  8/14/2020 0950 by Chandan Lopez RN  Outcome: Progressing     Problem: PAIN - ADULT  Goal: Verbalizes/displays adequate comfort level or baseline comfort level  Description: Interventions:  - Encourage patient to monitor pain and request assistance  - Assess pain using appropriate pain scale  - Administer analgesics based on type and severity of pain and evaluate response  - Implement non-pharmacological measures as appropriate and evaluate response  - Consider cultural and social influences on pain and pain management  - Notify physician/advanced practitioner if interventions unsuccessful or patient reports new pain  8/14/2020 1314 by Chandan Lopez RN  Outcome: Adequate for Discharge  8/14/2020 0950 by Chandan Lopez RN  Outcome: Progressing     Problem: INFECTION - ADULT  Goal: Absence or prevention of progression during hospitalization  Description: INTERVENTIONS:  - Assess and monitor for signs and symptoms of infection  - Monitor lab/diagnostic results  - Monitor all insertion sites, i e  indwelling lines, tubes, and drains  - Monitor endotracheal if appropriate and nasal secretions for changes in amount and color  - [place] appropriate cooling/warming therapies per order  - Administer medications as ordered  - Instruct and encourage patient and family to use good hand hygiene technique  - Identify and instruct in appropriate isolation precautions for identified infection/condition  8/14/2020 1314 by Gilma Hinton RN  Outcome: Adequate for Discharge  8/14/2020 0950 by Gilma Hinton RN  Outcome: Progressing     Problem: SAFETY ADULT  Goal: Patient will remain free of falls  Description: INTERVENTIONS:  - Assess patient frequently for physical needs  -  Identify cognitive and physical deficits and behaviors that affect risk of falls    -  [place] fall precautions as indicated by assessment   - Educate patient/family on patient safety including physical limitations  - Instruct patient to call for assistance with activity based on assessment  - Modify environment to reduce risk of injury  - Consider OT/PT consult to assist with strengthening/mobility  8/14/2020 1314 by Gilma Hinton RN  Outcome: Adequate for Discharge  8/14/2020 0950 by Gilma Hinton RN  Outcome: Progressing  Goal: Maintain or return to baseline ADL function  Description: INTERVENTIONS:  -  Assess patient's ability to carry out ADLs; assess patient's baseline for ADL function and identify physical deficits which impact ability to perform ADLs (bathing, care of mouth/teeth, toileting, grooming, dressing, etc )  - Assess/evaluate cause of self-care deficits   - Assess range of motion  - Assess patient's mobility; develop plan if impaired  - Assess patient's need for assistive devices and provide as appropriate  - Encourage maximum independence but intervene and supervise when necessary  - Involve family in performance of ADLs  - Assess for home care needs following discharge   - Consider OT consult to assist with ADL evaluation and planning for discharge  - Provide patient education as appropriate  8/14/2020 1314 by Gilma Hinton RN  Outcome: Adequate for Discharge  8/14/2020 0950 by Gilma Hinton RN  Outcome: Progressing  Goal: Maintain or return mobility status to optimal level  Description: INTERVENTIONS:  - Assess patient's baseline mobility status (ambulation, transfers, stairs, etc )    - Identify cognitive and physical deficits and behaviors that affect mobility  - Identify mobility aids required to assist with transfers and/or ambulation (gait belt, sit-to-stand, lift, walker, cane, etc )  - [place] fall precautions as indicated by assessment  - Record patient progress and toleration of activity level on Mobility SBAR; progress patient to next Phase/Stage  - Instruct patient to call for assistance with activity based on assessment  - Consider rehabilitation consult to assist with strengthening/weightbearing, etc   8/14/2020 1314 by Xenia Tracy RN  Outcome: Adequate for Discharge  8/14/2020 0950 by Xenia Tracy RN  Outcome: Progressing     Problem: DISCHARGE PLANNING  Goal: Discharge to home or other facility with appropriate resources  Description: INTERVENTIONS:  - Identify barriers to discharge w/patient and caregiver  - Arrange for needed discharge resources and transportation as appropriate  - Identify discharge learning needs (meds, wound care, etc )  - Arrange for interpretive services to assist at discharge as needed  - Refer to Case Management Department for coordinating discharge planning if the patient needs post-hospital services based on physician/advanced practitioner order or complex needs related to functional status, cognitive ability, or social support system  8/14/2020 1314 by Xenia Tracy RN  Outcome: Adequate for Discharge  8/14/2020 0950 by Xenia Tracy RN  Outcome: Progressing     Problem: Knowledge Deficit  Goal: Patient/family/caregiver demonstrates understanding of disease process, treatment plan, medications, and discharge instructions  Description: Complete learning assessment and assess knowledge base    Interventions:  - Provide teaching at level of understanding  - Provide teaching via preferred learning methods  8/14/2020 1314 by Xenia Tracy RN  Outcome: Adequate for Discharge  8/14/2020 0950 by Lorraine Gomez RN  Outcome: Progressing     Problem: Neurological Deficit  Goal: Neurological status is stable or improving  Description: Interventions:  - Monitor and assess patient's level of consciousness, motor function, sensory function, and level of assistance needed for ADLs  - Monitor and report changes from baseline  Collaborate with interdisciplinary team to initiate plan and implement interventions as ordered  - Provide and maintain a safe environment  - Consider seizure precautions  - Consider fall precautions  - Consider aspiration precautions  - Consider bleeding precautions  8/14/2020 1314 by Lorraine Gomez RN  Outcome: Adequate for Discharge  8/14/2020 0950 by Lorraine Gomez RN  Outcome: Progressing     Problem: Activity Intolerance/Impaired Mobility  Goal: Mobility/activity is maintained at optimum level for patient  Description: Interventions:  - Assess and monitor patient  barriers to mobility and need for assistive/adaptive devices  - Assess patient's emotional response to limitations  - Collaborate with interdisciplinary team and initiate plans and interventions as ordered  - Encourage independent activity per ability   - Maintain proper body alignment  - Perform active/passive rom as tolerated/ordered  - Plan activities to conserve energy   - Turn patient as appropriate  8/14/2020 1314 by Lorraine Gomez RN  Outcome: Adequate for Discharge  8/14/2020 0950 by Lorraine Gomez RN  Outcome: Progressing     Problem: Communication Impairment  Goal: Ability to express needs and understand communication  Description: Assess patient's communication skills and ability to understand information  Patient will demonstrate use of effective communication techniques, alternative methods of communication and understanding even if not able to speak       - Encourage communication and provide alternate methods of communication as needed  - Collaborate with case management/ for discharge needs  - Include patient/family/caregiver in decisions related to communication  8/14/2020 1314 by Stann Opitz, RN  Outcome: Adequate for Discharge  8/14/2020 0950 by Stann Opitz, RN  Outcome: Progressing     Problem: Potential for Aspiration  Goal: Non-ventilated patient's risk of aspiration is minimized  Description: Assess and monitor vital signs, respiratory status, and labs (WBC)  Monitor for signs of aspiration (tachypnea, cough, rales, wheezing, cyanosis, fever)  - Assess and monitor patient's ability to swallow  - Place patient up in chair to eat if possible  - HOB up at 90 degrees to eat if unable to get patient up into chair   - Supervise patient during oral intake  - Instruct patient/ family to take small bites  - Instruct patient/ family to take small single sips when taking liquids  - Follow patient-specific strategies generated by speech pathologist   8/14/2020 1314 by Stann Opitz, RN  Outcome: Adequate for Discharge  8/14/2020 0950 by Stann Opitz, RN  Outcome: Progressing     Problem: Nutrition  Goal: Nutrition/Hydration status is improving  Description: Monitor and assess patient's nutrition/hydration status for malnutrition (ex- brittle hair, bruises, dry skin, pale skin and conjunctiva, muscle wasting, smooth red tongue, and disorientation)  Collaborate with interdisciplinary team and initiate plan and interventions as ordered  Monitor patient's weight and dietary intake as ordered or per policy  Utilize nutrition screening tool and intervene per policy  Determine patient's food preferences and provide high-protein, high-caloric foods as appropriate  - Assist patient with eating   - Allow adequate time for meals   - Encourage patient to take dietary supplement as ordered    - Collaborate with clinical nutritionist   - Include patient/family/caregiver in decisions related to nutrition    8/14/2020 1314 by Hyacinth Arita RN  Outcome: Adequate for Discharge  8/14/2020 0950 by Hyacinth Arita RN  Outcome: Progressing     Problem: CARDIOVASCULAR - ADULT  Goal: Maintains optimal cardiac output and hemodynamic stability  Description: INTERVENTIONS:  - Monitor I/O, vital signs and rhythm  - Monitor for S/S and trends of decreased cardiac output  - Administer and titrate ordered vasoactive medications to optimize hemodynamic stability  - Assess quality of pulses, skin color and temperature  - Assess for signs of decreased coronary artery perfusion  - Instruct patient to report change in severity of symptoms  8/14/2020 1314 by Hyacinth Arita RN  Outcome: Adequate for Discharge  8/14/2020 0950 by Hyacinth Arita RN  Outcome: Progressing  Goal: Absence of cardiac dysrhythmias or at baseline rhythm  Description: INTERVENTIONS:  - Continuous cardiac monitoring, vital signs, obtain 12 lead EKG if ordered  - Administer antiarrhythmic and heart rate control medications as ordered  - Monitor electrolytes and administer replacement therapy as ordered  8/14/2020 1314 by Hyacinth Arita RN  Outcome: Adequate for Discharge  8/14/2020 0950 by Hyacinth Arita RN  Outcome: Progressing

## 2020-08-14 NOTE — PLAN OF CARE
Problem: Potential for Falls  Goal: Patient will remain free of falls  Description: INTERVENTIONS:  - Assess patient frequently for physical needs  -  Identify cognitive and physical deficits and behaviors that affect risk of falls    -  [place] fall precautions as indicated by assessment   - Educate patient/family on patient safety including physical limitations  - Instruct patient to call for assistance with activity based on assessment  - Modify environment to reduce risk of injury  - Consider OT/PT consult to assist with strengthening/mobility  Outcome: Progressing     Problem: PAIN - ADULT  Goal: Verbalizes/displays adequate comfort level or baseline comfort level  Description: Interventions:  - Encourage patient to monitor pain and request assistance  - Assess pain using appropriate pain scale  - Administer analgesics based on type and severity of pain and evaluate response  - Implement non-pharmacological measures as appropriate and evaluate response  - Consider cultural and social influences on pain and pain management  - Notify physician/advanced practitioner if interventions unsuccessful or patient reports new pain  Outcome: Progressing     Problem: INFECTION - ADULT  Goal: Absence or prevention of progression during hospitalization  Description: INTERVENTIONS:  - Assess and monitor for signs and symptoms of infection  - Monitor lab/diagnostic results  - Monitor all insertion sites, i e  indwelling lines, tubes, and drains  - Monitor endotracheal if appropriate and nasal secretions for changes in amount and color  - [place] appropriate cooling/warming therapies per order  - Administer medications as ordered  - Instruct and encourage patient and family to use good hand hygiene technique  - Identify and instruct in appropriate isolation precautions for identified infection/condition  Outcome: Progressing     Problem: SAFETY ADULT  Goal: Patient will remain free of falls  Description: INTERVENTIONS:  - Assess patient frequently for physical needs  -  Identify cognitive and physical deficits and behaviors that affect risk of falls    -  [place] fall precautions as indicated by assessment   - Educate patient/family on patient safety including physical limitations  - Instruct patient to call for assistance with activity based on assessment  - Modify environment to reduce risk of injury  - Consider OT/PT consult to assist with strengthening/mobility  Outcome: Progressing  Goal: Maintain or return to baseline ADL function  Description: INTERVENTIONS:  -  Assess patient's ability to carry out ADLs; assess patient's baseline for ADL function and identify physical deficits which impact ability to perform ADLs (bathing, care of mouth/teeth, toileting, grooming, dressing, etc )  - Assess/evaluate cause of self-care deficits   - Assess range of motion  - Assess patient's mobility; develop plan if impaired  - Assess patient's need for assistive devices and provide as appropriate  - Encourage maximum independence but intervene and supervise when necessary  - Involve family in performance of ADLs  - Assess for home care needs following discharge   - Consider OT consult to assist with ADL evaluation and planning for discharge  - Provide patient education as appropriate  Outcome: Progressing  Goal: Maintain or return mobility status to optimal level  Description: INTERVENTIONS:  - Assess patient's baseline mobility status (ambulation, transfers, stairs, etc )    - Identify cognitive and physical deficits and behaviors that affect mobility  - Identify mobility aids required to assist with transfers and/or ambulation (gait belt, sit-to-stand, lift, walker, cane, etc )  - [place] fall precautions as indicated by assessment  - Record patient progress and toleration of activity level on Mobility SBAR; progress patient to next Phase/Stage  - Instruct patient to call for assistance with activity based on assessment  - Consider rehabilitation consult to assist with strengthening/weightbearing, etc   Outcome: Progressing     Problem: DISCHARGE PLANNING  Goal: Discharge to home or other facility with appropriate resources  Description: INTERVENTIONS:  - Identify barriers to discharge w/patient and caregiver  - Arrange for needed discharge resources and transportation as appropriate  - Identify discharge learning needs (meds, wound care, etc )  - Arrange for interpretive services to assist at discharge as needed  - Refer to Case Management Department for coordinating discharge planning if the patient needs post-hospital services based on physician/advanced practitioner order or complex needs related to functional status, cognitive ability, or social support system  Outcome: Progressing     Problem: Knowledge Deficit  Goal: Patient/family/caregiver demonstrates understanding of disease process, treatment plan, medications, and discharge instructions  Description: Complete learning assessment and assess knowledge base  Interventions:  - Provide teaching at level of understanding  - Provide teaching via preferred learning methods  Outcome: Progressing     Problem: Neurological Deficit  Goal: Neurological status is stable or improving  Description: Interventions:  - Monitor and assess patient's level of consciousness, motor function, sensory function, and level of assistance needed for ADLs  - Monitor and report changes from baseline  Collaborate with interdisciplinary team to initiate plan and implement interventions as ordered  - Provide and maintain a safe environment  - Consider seizure precautions  - Consider fall precautions  - Consider aspiration precautions  - Consider bleeding precautions  Outcome: Progressing     Problem:  Activity Intolerance/Impaired Mobility  Goal: Mobility/activity is maintained at optimum level for patient  Description: Interventions:  - Assess and monitor patient  barriers to mobility and need for assistive/adaptive devices  - Assess patient's emotional response to limitations  - Collaborate with interdisciplinary team and initiate plans and interventions as ordered  - Encourage independent activity per ability   - Maintain proper body alignment  - Perform active/passive rom as tolerated/ordered  - Plan activities to conserve energy   - Turn patient as appropriate  Outcome: Progressing     Problem: Communication Impairment  Goal: Ability to express needs and understand communication  Description: Assess patient's communication skills and ability to understand information  Patient will demonstrate use of effective communication techniques, alternative methods of communication and understanding even if not able to speak  - Encourage communication and provide alternate methods of communication as needed  - Collaborate with case management/ for discharge needs  - Include patient/family/caregiver in decisions related to communication  Outcome: Progressing     Problem: Potential for Aspiration  Goal: Non-ventilated patient's risk of aspiration is minimized  Description: Assess and monitor vital signs, respiratory status, and labs (WBC)  Monitor for signs of aspiration (tachypnea, cough, rales, wheezing, cyanosis, fever)  - Assess and monitor patient's ability to swallow  - Place patient up in chair to eat if possible  - HOB up at 90 degrees to eat if unable to get patient up into chair   - Supervise patient during oral intake  - Instruct patient/ family to take small bites  - Instruct patient/ family to take small single sips when taking liquids    - Follow patient-specific strategies generated by speech pathologist   Outcome: Progressing     Problem: Nutrition  Goal: Nutrition/Hydration status is improving  Description: Monitor and assess patient's nutrition/hydration status for malnutrition (ex- brittle hair, bruises, dry skin, pale skin and conjunctiva, muscle wasting, smooth red tongue, and disorientation)  Collaborate with interdisciplinary team and initiate plan and interventions as ordered  Monitor patient's weight and dietary intake as ordered or per policy  Utilize nutrition screening tool and intervene per policy  Determine patient's food preferences and provide high-protein, high-caloric foods as appropriate  - Assist patient with eating   - Allow adequate time for meals   - Encourage patient to take dietary supplement as ordered  - Collaborate with clinical nutritionist   - Include patient/family/caregiver in decisions related to nutrition    Outcome: Progressing     Problem: CARDIOVASCULAR - ADULT  Goal: Maintains optimal cardiac output and hemodynamic stability  Description: INTERVENTIONS:  - Monitor I/O, vital signs and rhythm  - Monitor for S/S and trends of decreased cardiac output  - Administer and titrate ordered vasoactive medications to optimize hemodynamic stability  - Assess quality of pulses, skin color and temperature  - Assess for signs of decreased coronary artery perfusion  - Instruct patient to report change in severity of symptoms  Outcome: Progressing  Goal: Absence of cardiac dysrhythmias or at baseline rhythm  Description: INTERVENTIONS:  - Continuous cardiac monitoring, vital signs, obtain 12 lead EKG if ordered  - Administer antiarrhythmic and heart rate control medications as ordered  - Monitor electrolytes and administer replacement therapy as ordered  Outcome: Progressing

## 2020-08-14 NOTE — PROGRESS NOTES
Progress Note - Neurology   Fabien Matter 26 y o  male [id number]  Unit/Bed#: /-01    Assessment:  3 26year-old with HTN and  who presented to [facility] on 8/12 for episodes of lightheadedness, shortness of breath, ambulatory dysfunction, stuttering speech, left arm/face/leg weakness/numbness, and difficulty getting words out  Patient was recently seen in the ED on 8/4 for a similar episode of chest pain, shortness of breath, lightheadedness, stuttering speech, and LUE numbness; patient was diagnosed with HTN and placed on Norvasc at that time  During this admission, patient's BP on arrival was 162/86  CTA head and neck was negative for large vessel occlusion or significant stenosis  MRI brain was negative for acute infarction  Patient admitted to multiple stressors, suspect most likely stress related reaction  Plan:  - Discontinue ASA 81mg  - Discontinue Lipitor 40mg (borderline LDL of 112, recommended diet/exercise and recheck in 6 months)  - Smoking cessation advised  Continue Nicotine patch   - Recommend outpatient sleep study  - Recommend outpatient Psychologist referral  - Continue as needed Tylenol  - Will give magnesium sulfate 1g X 1 dose for headache pain  - Neuro checks  - PT/OT  - Notify Neurology with any changes in neuro examination  - Medical management and supportive care as per primary team  - Patient will need outpatient Neurology follow up  Communication has been sent to the office  Results:  1  MRI brain: No significant intracranial pathology identified  Consistent with prior CTA  2  CTA head and neck: No evidence of acute intracranial hemorrhage  No evidence of hemodynamic significant stenosis, aneurysm or dissection  If persistent focal neurologic deficit, consider follow-up noncontrast brain MRI  Subjective:   Patient states that he is doing okay  He states that he woke up with a L supraorbital headache   He states that the pain was 5/10 and has decreased to a 3/10 with Tylenol  Patient denies nausea and vomiting  He states that he ate breakfast this morning  He denies any lightheadedness  Patient states that he slept okay last night  Past Medical History:   Diagnosis Date    Hypertension      Past Surgical History:   Procedure Laterality Date    SHOULDER SURGERY       History reviewed  No pertinent family history  Social History     Socioeconomic History    Marital status: Single     Spouse name: None    Number of children: None    Years of education: None    Highest education level: None   Occupational History    None   Social Needs    Financial resource strain: None    Food insecurity     Worry: None     Inability: None    Transportation needs     Medical: None     Non-medical: None   Tobacco Use    Smoking status: Current Every Day Smoker     Packs/day: 0 50    Smokeless tobacco: Never Used   Substance and Sexual Activity    Alcohol use: Yes    Drug use: Yes     Types: Marijuana    Sexual activity: Yes   Lifestyle    Physical activity     Days per week: None     Minutes per session: None    Stress: None   Relationships    Social connections     Talks on phone: None     Gets together: None     Attends Anabaptism service: None     Active member of club or organization: None     Attends meetings of clubs or organizations: None     Relationship status: None    Intimate partner violence     Fear of current or ex partner: None     Emotionally abused: None     Physically abused: None     Forced sexual activity: None   Other Topics Concern    None   Social History Narrative    None         Medications:   All current active meds have been reviewed and current meds:  Scheduled Meds:  Current Facility-Administered Medications   Medication Dose Route Frequency Provider Last Rate    acetaminophen  650 mg Oral Q6H PRN Lizbeth Robles MD      amLODIPine  5 mg Oral Daily Christopher Porter MD      heparin (porcine)  5,000 Units Subcutaneous [facility] Christopher Porter MD  nicotine  1 patch Transdermal Daily Keren Mackey MD      ondansetron  4 mg Intravenous Q6H PRN Keren Mackey MD       Continuous Infusions:   PRN Meds:   acetaminophen    ondansetron     ROS:  A 12 point ROS was completed  Other than the above mentioned complaints in the HPI, all remaining systems were negative  Vitals:   /74 (BP Location: Left arm)   Pulse 78   Temp 98 1 °F (36 7 °C) (Oral)   Resp 16   Ht 6' 2" (1 88 m)   Wt (!) 165 kg (364 lb 13 8 oz)   SpO2 96%   BMI 46 85 kg/m²     Physical Exam:   Physical Exam  Constitutional:       General: He is not in acute distress  Appearance: He is not ill-appearing  HENT:      Head: Normocephalic and atraumatic  Right Ear: External ear normal       Left Ear: External ear normal       Nose: Nose normal    Eyes:      General:         Right eye: No discharge  Left eye: No discharge  Extraocular Movements: EOM normal       Pupils: Pupils are equal, round, and reactive to light  Comments: No orbital pain to palpation   Pulmonary:      Effort: Pulmonary effort is normal  No respiratory distress  Musculoskeletal:      Comments: No neck/shoulder tenderness to palpation   Skin:     General: Skin is warm and dry  Neurological:      Mental Status: He is alert  Psychiatric:         Mood and Affect: Mood normal          Behavior: Behavior normal        Neurologic Exam     Mental Status   Patient was alert and oriented to location and date  Patient followed all commands appropriately     Cranial Nerves     CN III, IV, VI   Pupils are equal, round, and reactive to light  Extraocular motions are normal    Conjugate gaze: present    CN VII   Facial expression full, symmetric  CN VIII   Hearing: intact    CN XII   CN XII normal      Motor Exam     Strength   Strength 5/5 except as noted   Left  strength: 4+/5     Sensory Exam   Light touch normal      Gait, Coordination, and Reflexes     Tremor   Resting tremor: absent    Labs: I have personally reviewed pertinent reports  No results found for this or any previous visit (from the past 24 hour(s))  Imaging: I have personally reviewed pertinent imaging and I have personally reviewed PACS reports  EKG, Pathology, and Other Studies: I have personally reviewed pertinent reports  VTE Prophylaxis: Sequential compression device (Venodyne)     Total time spent today 26 minutes  Greater than 50% of total time was spent with the patient and / or family counseling and / or coordination of care  A description of the counseling / coordination of care: Discussed with patient: reviewed results and showed MR imaging to patient, discussed suspected etiology, medication regimen, upcoming Neurology appointments, and plans of care

## 2020-08-14 NOTE — UTILIZATION REVIEW
Continued Stay Review    Date: 8/14/20                     Current Patient Class: Observation  Current Level of Care: Med Surg    HPI:29 y o  male with past medical history of morbid obesity, tobacco use and  hypertension, initially admitted on 8/12/20 for evaluation and treatment of left sided weakness r/o stroke  CTA negative for acute finding  Neurology on consult, concern for possible small vessell stroke  8/14 Assessment/Plan:     Pertinent Labs/Diagnostic Results:     8/13 MRI brain:  No significant intracranial pathology identified        Results from last 7 days   Lab Units 08/12/20  1643   SARS-COV-2  Negative     Results from last 7 days   Lab Units 08/12/20  1643   WBC Thousand/uL 6 42   HEMOGLOBIN g/dL 16 0   HEMATOCRIT % 48 0   PLATELETS Thousands/uL 219         Results from last 7 days   Lab Units 08/12/20  1643   SODIUM mmol/L 141   POTASSIUM mmol/L 4 0   CHLORIDE mmol/L 105   CO2 mmol/L 30   ANION GAP mmol/L 6   BUN mg/dL 11   CREATININE mg/dL 0 90   EGFR ml/min/1 73sq m 114   CALCIUM mg/dL 9 1         Results from last 7 days   Lab Units 08/12/20  1600   POC GLUCOSE mg/dl 117     Results from last 7 days   Lab Units 08/12/20  1643   GLUCOSE RANDOM mg/dL 96         Results from last 7 days   Lab Units 08/13/20  0525   HEMOGLOBIN A1C % 5 7*   EAG mg/dl 117       Results from last 7 days   Lab Units 08/12/20  1643   TROPONIN I ng/mL <0 02         Results from last 7 days   Lab Units 08/12/20  1643   PROTIME seconds 12 8   INR  1 01   PTT seconds 27           Results from last 7 days   Lab Units 08/13/20  0350   CLARITY UA  Clear   COLOR UA  Yellow   SPEC GRAV UA  1 020   PH UA  6 5   GLUCOSE UA mg/dl Negative   KETONES UA mg/dl Negative   BLOOD UA  Negative   PROTEIN UA mg/dl Negative   NITRITE UA  Negative   BILIRUBIN UA  Negative   UROBILINOGEN UA E U /dl 1 0   LEUKOCYTES UA  Negative       Vital Signs:     Date/Time   Temp   Pulse   Resp   BP   MAP (mmHg)   SpO2   O2 Device     08/14/20 0737    William Sandoval         None (Room air)     08/14/20 0700   98 1 °F (36 7 °C)   78   16   138/74      96 %   None (Room air)     08/14/20 0300   97 7 °F (36 5 °C)   78   18   113/69      96 %   None (Room air)     08/13/20 2300   98 3 °F (36 8 °C)   79   16   117/82      93 %   None (Room air)     08/13/20 19:08:11   98 8 °F (37 1 °C)   87   16   113/78   90   97 %            [place] Coma Scale Score  User        08/14/20 0737  4  5  6  15  RLM    08/14/20 0700  4  5  6  15  RLM    08/14/20 0300  4  5  6  15  CG    08/13/20 2300  4  5  6  15  CG    08/13/20 1908  4  5  6  15  CG          Medications:   Scheduled Medications:  amLODIPine, 5 mg, Oral, Daily  aspirin, 81 mg, Oral, Daily  atorvastatin, 40 mg, Oral, QPM  heparin (porcine), 5,000 Units, Subcutaneous, Q8H [address]  nicotine, 1 patch, Transdermal, Daily      Continuous IV Infusions: None  PRN Meds:  acetaminophen, 650 mg, Oral, Q6H PRN  ondansetron, 4 mg, Intravenous, Q6H PRN        Discharge Plan: D    Network Utilization Review Department  [email or web address]  ATTENTION: Please call with any questions or concerns to [phone] and carefully listen to the prompts so that you are directed to the right person  All voicemails are confidential   Herminia Doan all requests for admission clinical reviews, approved or denied determinations and any other requests to dedicated fax number below belonging to the campus where the patient is receiving treatment   List of dedicated fax numbers for the Facilities:  [address] DENIALS (Administrative/Medical Necessity) [phone]   [address] (Maternity/NICU/Pediatrics) [phone]   Jay Challenger [phone]   Sp Reza [phone]   [facility] Katelyn [phone]   [facility] [place] [place] [address] [facility] [phone]   [address] [facility], [facility]  [phone] [address] [address] [address] [facility] [phone]

## 2020-08-14 NOTE — PLAN OF CARE
Problem: Potential for Falls  Goal: Patient will remain free of falls  Description: INTERVENTIONS:  - Assess patient frequently for physical needs  -  Identify cognitive and physical deficits and behaviors that affect risk of falls    -  [place] fall precautions as indicated by assessment   - Educate patient/family on patient safety including physical limitations  - Instruct patient to call for assistance with activity based on assessment  - Modify environment to reduce risk of injury  - Consider OT/PT consult to assist with strengthening/mobility  Outcome: Progressing     Problem: PAIN - ADULT  Goal: Verbalizes/displays adequate comfort level or baseline comfort level  Description: Interventions:  - Encourage patient to monitor pain and request assistance  - Assess pain using appropriate pain scale  - Administer analgesics based on type and severity of pain and evaluate response  - Implement non-pharmacological measures as appropriate and evaluate response  - Consider cultural and social influences on pain and pain management  - Notify physician/advanced practitioner if interventions unsuccessful or patient reports new pain  Outcome: Progressing     Problem: INFECTION - ADULT  Goal: Absence or prevention of progression during hospitalization  Description: INTERVENTIONS:  - Assess and monitor for signs and symptoms of infection  - Monitor lab/diagnostic results  - Monitor all insertion sites, i e  indwelling lines, tubes, and drains  - Monitor endotracheal if appropriate and nasal secretions for changes in amount and color  - [place] appropriate cooling/warming therapies per order  - Administer medications as ordered  - Instruct and encourage patient and family to use good hand hygiene technique  - Identify and instruct in appropriate isolation precautions for identified infection/condition  Outcome: Progressing     Problem: SAFETY ADULT  Goal: Patient will remain free of falls  Description: INTERVENTIONS:  - Assess patient frequently for physical needs  -  Identify cognitive and physical deficits and behaviors that affect risk of falls    -  [place] fall precautions as indicated by assessment   - Educate patient/family on patient safety including physical limitations  - Instruct patient to call for assistance with activity based on assessment  - Modify environment to reduce risk of injury  - Consider OT/PT consult to assist with strengthening/mobility  Outcome: Progressing  Goal: Maintain or return to baseline ADL function  Description: INTERVENTIONS:  -  Assess patient's ability to carry out ADLs; assess patient's baseline for ADL function and identify physical deficits which impact ability to perform ADLs (bathing, care of mouth/teeth, toileting, grooming, dressing, etc )  - Assess/evaluate cause of self-care deficits   - Assess range of motion  - Assess patient's mobility; develop plan if impaired  - Assess patient's need for assistive devices and provide as appropriate  - Encourage maximum independence but intervene and supervise when necessary  - Involve family in performance of ADLs  - Assess for home care needs following discharge   - Consider OT consult to assist with ADL evaluation and planning for discharge  - Provide patient education as appropriate  Outcome: Progressing  Goal: Maintain or return mobility status to optimal level  Description: INTERVENTIONS:  - Assess patient's baseline mobility status (ambulation, transfers, stairs, etc )    - Identify cognitive and physical deficits and behaviors that affect mobility  - Identify mobility aids required to assist with transfers and/or ambulation (gait belt, sit-to-stand, lift, walker, cane, etc )  - [place] fall precautions as indicated by assessment  - Record patient progress and toleration of activity level on Mobility SBAR; progress patient to next Phase/Stage  - Instruct patient to call for assistance with activity based on assessment  - Consider rehabilitation consult to assist with strengthening/weightbearing, etc   Outcome: Progressing     Problem: DISCHARGE PLANNING  Goal: Discharge to home or other facility with appropriate resources  Description: INTERVENTIONS:  - Identify barriers to discharge w/patient and caregiver  - Arrange for needed discharge resources and transportation as appropriate  - Identify discharge learning needs (meds, wound care, etc )  - Arrange for interpretive services to assist at discharge as needed  - Refer to Case Management Department for coordinating discharge planning if the patient needs post-hospital services based on physician/advanced practitioner order or complex needs related to functional status, cognitive ability, or social support system  Outcome: Progressing     Problem: Knowledge Deficit  Goal: Patient/family/caregiver demonstrates understanding of disease process, treatment plan, medications, and discharge instructions  Description: Complete learning assessment and assess knowledge base  Interventions:  - Provide teaching at level of understanding  - Provide teaching via preferred learning methods  Outcome: Progressing     Problem: Neurological Deficit  Goal: Neurological status is stable or improving  Description: Interventions:  - Monitor and assess patient's level of consciousness, motor function, sensory function, and level of assistance needed for ADLs  - Monitor and report changes from baseline  Collaborate with interdisciplinary team to initiate plan and implement interventions as ordered  - Provide and maintain a safe environment  - Consider seizure precautions  - Consider fall precautions  - Consider aspiration precautions  - Consider bleeding precautions  Outcome: Progressing     Problem:  Activity Intolerance/Impaired Mobility  Goal: Mobility/activity is maintained at optimum level for patient  Description: Interventions:  - Assess and monitor patient  barriers to mobility and need for assistive/adaptive devices  - Assess patient's emotional response to limitations  - Collaborate with interdisciplinary team and initiate plans and interventions as ordered  - Encourage independent activity per ability   - Maintain proper body alignment  - Perform active/passive rom as tolerated/ordered  - Plan activities to conserve energy   - Turn patient as appropriate  Outcome: Progressing     Problem: Communication Impairment  Goal: Ability to express needs and understand communication  Description: Assess patient's communication skills and ability to understand information  Patient will demonstrate use of effective communication techniques, alternative methods of communication and understanding even if not able to speak  - Encourage communication and provide alternate methods of communication as needed  - Collaborate with case management/ for discharge needs  - Include patient/family/caregiver in decisions related to communication  Outcome: Progressing     Problem: Potential for Aspiration  Goal: Non-ventilated patient's risk of aspiration is minimized  Description: Assess and monitor vital signs, respiratory status, and labs (WBC)  Monitor for signs of aspiration (tachypnea, cough, rales, wheezing, cyanosis, fever)  - Assess and monitor patient's ability to swallow  - Place patient up in chair to eat if possible  - HOB up at 90 degrees to eat if unable to get patient up into chair   - Supervise patient during oral intake  - Instruct patient/ family to take small bites  - Instruct patient/ family to take small single sips when taking liquids    - Follow patient-specific strategies generated by speech pathologist   Outcome: Progressing     Problem: Nutrition  Goal: Nutrition/Hydration status is improving  Description: Monitor and assess patient's nutrition/hydration status for malnutrition (ex- brittle hair, bruises, dry skin, pale skin and conjunctiva, muscle wasting, smooth red tongue, and disorientation)  Collaborate with interdisciplinary team and initiate plan and interventions as ordered  Monitor patient's weight and dietary intake as ordered or per policy  Utilize nutrition screening tool and intervene per policy  Determine patient's food preferences and provide high-protein, high-caloric foods as appropriate  - Assist patient with eating   - Allow adequate time for meals   - Encourage patient to take dietary supplement as ordered  - Collaborate with clinical nutritionist   - Include patient/family/caregiver in decisions related to nutrition    Outcome: Progressing     Problem: CARDIOVASCULAR - ADULT  Goal: Maintains optimal cardiac output and hemodynamic stability  Description: INTERVENTIONS:  - Monitor I/O, vital signs and rhythm  - Monitor for S/S and trends of decreased cardiac output  - Administer and titrate ordered vasoactive medications to optimize hemodynamic stability  - Assess quality of pulses, skin color and temperature  - Assess for signs of decreased coronary artery perfusion  - Instruct patient to report change in severity of symptoms  Outcome: Progressing  Goal: Absence of cardiac dysrhythmias or at baseline rhythm  Description: INTERVENTIONS:  - Continuous cardiac monitoring, vital signs, obtain 12 lead EKG if ordered  - Administer antiarrhythmic and heart rate control medications as ordered  - Monitor electrolytes and administer replacement therapy as ordered  Outcome: Progressing

## 2020-08-14 NOTE — DISCHARGE SUMMARY
Discharge- Fabien Matter 1990, 26 y o  male MRN: [id number]    Unit/Bed#: -01 Encounter: [id number]    Primary Care Provider: Obi Alvarez MD   Date and time admitted to hospital: 8/12/2020  3:56 PM        * Stroke-like symptoms  Assessment & Plan  Left face, arm and leg weakness rule out stroke  Patient with H/o left-sided face, arm and leg weakness  Patient also complains of B/L blurry vision, lightheadedness, slurry speech started intermittently followed by constant since 3 weeks  H/o stroke in brother in 35s  All labs including troponin x3, EKG, CTA head and neck, MRI brain, lipid panel, PT/PTT are normal   Neurology was consulted and are thinking symptoms possibly due to stress reaction  Patient does complain of sleep deprivation, feeling of apprehension during sleep, dyspnea during sleep, not using CPAP at home  Plan  -MRI of brain was normal   -continue PT OT   -stop aspirin , heparin and statin  -swallow study showed no difficulty swallowing   -monitor frequent neuro checks    MATIAS (obstructive sleep apnea)  Assessment & Plan  Patient complains of difficulty falling asleep  Waking up during middle of the night with feeling of impending death and dyspnea  Increasing daytime sleepiness  Not using CPAP at home  H/o obesity with BMI 46    Plan  -educated regarding proper sleeping position  -encouraged to lose weight with proper diet and regular exercise      Tobacco abuse  Assessment & Plan  Nicotine patch and counseling    Morbid obesity ([address] )  Assessment & Plan  Encourage patient regarding healthy diet and regular exercise    Essential hypertension  Assessment & Plan  Current blood pressure is stable   -continue amlodipine 5 mg daily          Discharging Resident Physician: Arpit Sosa MD  Attending: Daniella Ramon MD  PCP: Obi Alvarez MD  Admission Date: 8/12/2020  Discharge Date: 08/14/20    Disposition:     Home    Reason for Admission:  Weakness, numbness of left face, arm and leg with dizziness, blurry vision  Consultations During Hospital Stay:  · Neurology    Procedures Performed:     · None    Primary diagnosis:-  Stroke-like symptoms    Second diagnosis:-  HTN  Morbid obesity  Tobacco abuse  MATIAS    Significant Findings / Test Results:     · Weakness, numbness of left face, arm and leg  · EKG, echo, MRI, CTA head and neck were normal   · All labs were normal     Incidental Findings:   · None    Test Results Pending at Discharge (will require follow up): · None     Outpatient Tests Requested:  · None    Complications:  None    Hospital Course:     Shelly Payne is a 26 y o  male patient PMH of morbid obesity, HTN, smoking who originally presented to the hospital on 8/12/2020 due to mild weakness, numbness of left face, arm and leg since 3 weeks and worse since 1 week  He also had blurry vision with lightheadedness present on admission  Stroke alert was created and patient was started on aspirin and statin  Vitals were stable on admission  All labs including EKG, echo, CTA head and neck, MRI of brain were normal   Patient was admitted to hospital unit under observation for further evaluation  Neurology was consulted and they advised possible stroke-like symptoms secondary to some stress reaction or sleep deprivation  On discharge he was hemodynamically stable, tolerating diet well, symptoms improved, all labs were clean  On discharge he was encouraged to eat a healthy diet, exercises regularly, lose weight and get good night sleep  He was encouraged to stop smoking  Condition at Discharge: stable     Discharge Day Visit / Exam:     Subjective:  Patient seen and examined by me at bedside  He was sitting on recliner of  Communicated clearly  No particular overnight event reported  He states improvement in weakness, numbness, dizziness and vision today    Vitals: Blood Pressure: 138/74 (08/14/20 0700)  Pulse: 78 (08/14/20 0700)  Temperature: 98 1 °F (36 7 °C) (08/14/20 0700)  Temp Source: Oral (08/14/20 0700)  Respirations: 16 (08/14/20 0700)  Height: 6' 2" (188 cm) (08/12/20 1558)  Weight - Scale: (!) 165 kg (364 lb 13 8 oz) (08/14/20 0600)  SpO2: 96 % (08/14/20 0700)  Exam:   Physical Exam  Vitals signs reviewed  Constitutional:       General: He is not in acute distress  Appearance: He is well-developed  HENT:      Head: Normocephalic and atraumatic  Eyes:      General: No scleral icterus  Conjunctiva/sclera: Conjunctivae normal       Pupils: Pupils are equal, round, and reactive to light  Neck:      Musculoskeletal: Normal range of motion and neck supple  Cardiovascular:      Rate and Rhythm: Normal rate and regular rhythm  Pulses: Normal pulses  Heart sounds: Normal heart sounds  Pulmonary:      Effort: Pulmonary effort is normal       Breath sounds: Normal breath sounds  Chest:      Chest wall: No tenderness  Abdominal:      General: Bowel sounds are normal  There is no distension  Palpations: Abdomen is soft  Tenderness: There is no abdominal tenderness  Musculoskeletal: Normal range of motion  General: No swelling or tenderness  Skin:     General: Skin is warm  Coloration: Skin is not pale  Neurological:      General: No focal deficit present  Mental Status: He is alert and oriented to person, place, and time  Psychiatric:         Mood and Affect: Mood normal          Speech: He is communicative  Behavior: Behavior normal  Behavior is not agitated  Thought Content: Thought content normal          Judgment: Judgment normal        Discussion with Family:  No    Discharge instructions/Information to patient and family:   See after visit summary for information provided to patient and family  Provisions for Follow-Up Care:  See after visit summary for information related to follow-up care and any pertinent home health orders        Planned Readmission:  No     Discharge Medications:  See after visit summary for reconciled discharge medications provided to patient and family        ** Please Note: This note has been constructed using a voice recognition system **

## 2020-08-17 ENCOUNTER — TELEPHONE (OUTPATIENT)
Dept: NEUROLOGY | Facility: CLINIC | Age: 30
End: 2020-08-17

## 2020-08-17 NOTE — UTILIZATION REVIEW
Notification of Discharge  This is a Notification of Discharge from our facility [address]  Please be advised that this patient has been discharge from our facility  Below you will find the admission and discharge date and time including the patients disposition  PRESENTATION DATE: 8/12/2020  3:56 PM  OBS ADMISSION DATE: 08/12/2020  IP ADMISSION DATE: N/A N/A   DISCHARGE DATE: 8/14/2020  1:22 PM  DISPOSITION: Home/Self Care Home/Self Care   Admission Orders listed below:  Admission Orders (From admission, onward)     Ordered        08/12/20 1832  Place in Observation  Once         08/12/20 1836  Place in Observation  Once                   Please contact the UR Department if additional information is required to close this patient's authorization/case  [facility] Utilization Review Department  Main: [phone] x carefully listen to the prompts  All voicemails are confidential   [email or web address]  Send all requests for admission clinical reviews, approved or denied determinations and any other requests to dedicated fax number below belonging to the campus where the patient is receiving treatment   List of dedicated fax numbers:  [address] DENIALS (Administrative/Medical Necessity) [phone]   [address]  (Maternity/NICU/Pediatrics) [phone]   Yojana Irwin [phone]   Jamel Guillen [phone]   Sara Mendez [phone]   [address] [phone]   [facility]  [phone]   [address] [place] [address], [address]  [address] [facility] 1000 [facility] [phone]

## 2020-08-17 NOTE — TELEPHONE ENCOUNTER
----- Message from Johny Kaminski PA-C sent at 8/14/2020  1:21 PM EDT -----  Regarding: HFU  Diagnosis/Reason for follow-up: Episodes of lightheadedness, SOB, L sided weakness/numbness, suspect stress reaction  Subspecialty for follow-up: General  Existing neurologist: N/A  Recommended timing for HFU: 4-6 weeks  Tests/Labs/Imaging ordered: N/A  AP/Attending: Either  Additional notes: N/A    Thanks!

## 2020-08-18 ENCOUNTER — TRANSCRIBE ORDERS (OUTPATIENT)
Dept: NEUROLOGY | Facility: CLINIC | Age: 30
End: 2020-08-18

## 2020-08-18 DIAGNOSIS — R20.0 NUMBNESS: ICD-10-CM

## 2020-08-18 DIAGNOSIS — R42 LIGHTHEADEDNESS: Primary | ICD-10-CM

## 2020-08-18 DIAGNOSIS — R53.1 WEAKNESS: ICD-10-CM

## 2021-07-17 ENCOUNTER — HOSPITAL ENCOUNTER (EMERGENCY)
Facility: HOSPITAL | Age: 31
Discharge: HOME | End: 2021-07-17
Attending: EMERGENCY MEDICINE
Payer: COMMERCIAL

## 2021-07-17 VITALS
WEIGHT: 315 LBS | BODY MASS INDEX: 39.17 KG/M2 | DIASTOLIC BLOOD PRESSURE: 93 MMHG | SYSTOLIC BLOOD PRESSURE: 142 MMHG | RESPIRATION RATE: 18 BRPM | HEIGHT: 75 IN | TEMPERATURE: 98 F | OXYGEN SATURATION: 96 % | HEART RATE: 117 BPM

## 2021-07-17 DIAGNOSIS — H10.30 CONJUNCTIVITIS, ACUTE: Primary | ICD-10-CM

## 2021-07-17 LAB
ALBUMIN SERPL BCP-MCNC: 3.6 G/DL (ref 3.5–5)
ALP SERPL-CCNC: 99 U/L (ref 46–116)
ALT SERPL W P-5'-P-CCNC: 47 U/L (ref 12–78)
ANION GAP SERPL CALCULATED.3IONS-SCNC: 8 MMOL/L (ref 4–13)
AST SERPL W P-5'-P-CCNC: 24 U/L (ref 5–45)
BASOPHILS # BLD AUTO: 0.04 THOUSANDS/ΜL (ref 0–0.1)
BASOPHILS NFR BLD AUTO: 1 % (ref 0–1)
BILIRUB SERPL-MCNC: 0.34 MG/DL (ref 0.2–1)
BUN SERPL-MCNC: 11 MG/DL (ref 5–25)
CALCIUM SERPL-MCNC: 8.6 MG/DL (ref 8.3–10.1)
CHLORIDE SERPL-SCNC: 104 MMOL/L (ref 100–108)
CO2 SERPL-SCNC: 27 MMOL/L (ref 21–32)
CREAT SERPL-MCNC: 0.93 MG/DL (ref 0.6–1.3)
EOSINOPHIL # BLD AUTO: 0.19 THOUSAND/ΜL (ref 0–0.61)
EOSINOPHIL NFR BLD AUTO: 3 % (ref 0–6)
ERYTHROCYTE [DISTWIDTH] IN BLOOD BY AUTOMATED COUNT: 12.5 % (ref 11.6–15.1)
GFR SERPL CREATININE-BSD FRML MDRD: 109 ML/MIN/1.73SQ M
GLUCOSE SERPL-MCNC: 92 MG/DL (ref 65–140)
HCT VFR BLD AUTO: 48.2 % (ref 36.5–49.3)
HGB BLD-MCNC: 16 G/DL (ref 12–17)
IMM GRANULOCYTES # BLD AUTO: 0.01 THOUSAND/UL (ref 0–0.2)
IMM GRANULOCYTES NFR BLD AUTO: 0 % (ref 0–2)
LYMPHOCYTES # BLD AUTO: 2.82 THOUSANDS/ΜL (ref 0.6–4.47)
LYMPHOCYTES NFR BLD AUTO: 46 % (ref 14–44)
MCH RBC QN AUTO: 30.5 PG (ref 26.8–34.3)
MCHC RBC AUTO-ENTMCNC: 33.2 G/DL (ref 31.4–37.4)
MCV RBC AUTO: 92 FL (ref 82–98)
MONOCYTES # BLD AUTO: 0.46 THOUSAND/ΜL (ref 0.17–1.22)
MONOCYTES NFR BLD AUTO: 7 % (ref 4–12)
NEUTROPHILS # BLD AUTO: 2.66 THOUSANDS/ΜL (ref 1.85–7.62)
NEUTS SEG NFR BLD AUTO: 43 % (ref 43–75)
NRBC BLD AUTO-RTO: 0 /100 WBCS
PLATELET # BLD AUTO: 254 THOUSANDS/UL (ref 149–390)
PMV BLD AUTO: 10.4 FL (ref 8.9–12.7)
POTASSIUM SERPL-SCNC: 3.9 MMOL/L (ref 3.5–5.3)
PROT SERPL-MCNC: 7.3 G/DL (ref 6.4–8.2)
RBC # BLD AUTO: 5.24 MILLION/UL (ref 3.88–5.62)
SODIUM SERPL-SCNC: 139 MMOL/L (ref 136–145)
WBC # BLD AUTO: 6.18 THOUSAND/UL (ref 4.31–10.16)

## 2021-07-17 PROCEDURE — 80053 COMPREHEN METABOLIC PANEL: CPT | Performed by: PHYSICIAN ASSISTANT

## 2021-07-17 PROCEDURE — 36415 COLL VENOUS BLD VENIPUNCTURE: CPT | Performed by: PHYSICIAN ASSISTANT

## 2021-07-17 PROCEDURE — 99283 EMERGENCY DEPT VISIT LOW MDM: CPT

## 2021-07-17 PROCEDURE — 85025 COMPLETE CBC W/AUTO DIFF WBC: CPT | Performed by: PHYSICIAN ASSISTANT

## 2021-07-17 PROCEDURE — 99284 EMERGENCY DEPT VISIT MOD MDM: CPT | Performed by: EMERGENCY MEDICINE

## 2021-07-17 RX ORDER — GENTAMICIN SULFATE 3 MG/ML
1 SOLUTION/ DROPS OPHTHALMIC
Qty: 5 ML | Refills: 0 | Status: SHIPPED | OUTPATIENT
Start: 2021-07-17

## 2021-07-17 NOTE — ED PROVIDER NOTES
History  Chief Complaint   Patient presents with    Eye Problem     Patient reports had EGD on the 14th and had had a yellow R eye ever since  Patient reports a little bit of pain but denies vision changes  32 yo male pt with yellow and red discoloration of right eye  Started 3 days ago after EGD  Mild discomfort  Constant  No vision changes  No fever or chills  No trauma or injury to eye  Wears glasses but not contacts  History provided by:  Patient   used: No    Eye Problem  Location:  Right eye  Quality: yellow and red dislocoration  Severity:  Mild  Onset quality:  Gradual  Duration:  3 days  Timing:  Constant  Progression:  Unchanged  Chronicity:  New  Context: not burn, not chemical exposure, not contact lens problem, not direct trauma, not foreign body, not using machinery, not scratch, not smoke exposure and not UV exposure    Relieved by:  Nothing  Worsened by:  Nothing  Ineffective treatments:  None tried  Associated symptoms: redness    Associated symptoms: no vomiting        Prior to Admission Medications   Prescriptions Last Dose Informant Patient Reported? Taking? amLODIPine (NORVASC) 5 mg tablet   Yes No   Sig: Take 5 mg by mouth daily      Facility-Administered Medications: None       Past Medical History:   Diagnosis Date    Hypertension        Past Surgical History:   Procedure Laterality Date    SHOULDER SURGERY         History reviewed  No pertinent family history  I have reviewed and agree with the history as documented  E-Cigarette/Vaping     E-Cigarette/Vaping Substances     Social History     Tobacco Use    Smoking status: Former Smoker     Packs/day: 0 50    Smokeless tobacco: Never Used   Substance Use Topics    Alcohol use: Yes    Drug use: Yes     Types: Marijuana       Review of Systems   Constitutional: Negative for chills and fever  HENT: Negative for ear pain and sore throat  Eyes: Positive for redness   Negative for pain and visual disturbance  Respiratory: Negative for cough and shortness of breath  Cardiovascular: Negative for chest pain and palpitations  Gastrointestinal: Negative for abdominal pain and vomiting  Genitourinary: Negative for dysuria and hematuria  Musculoskeletal: Negative for arthralgias and back pain  Skin: Negative for color change and rash  Neurological: Negative for seizures and syncope  All other systems reviewed and are negative  Physical Exam  Physical Exam  Vitals and nursing note reviewed  Constitutional:       Appearance: He is well-developed  HENT:      Head: Normocephalic and atraumatic  Eyes:      General: Lids are normal  Vision grossly intact  Conjunctiva/sclera: Conjunctivae normal       Comments: Yellow discoloration near medial canthus, not scleral icterus  No ptyergium  Mild conjunctival injection right eye  Normal EOM  PERRL  Cardiovascular:      Rate and Rhythm: Normal rate and regular rhythm  Heart sounds: No murmur heard  Pulmonary:      Effort: Pulmonary effort is normal  No respiratory distress  Breath sounds: Normal breath sounds  Abdominal:      Palpations: Abdomen is soft  Tenderness: There is no abdominal tenderness  Musculoskeletal:      Cervical back: Neck supple  Skin:     General: Skin is warm and dry  Neurological:      Mental Status: He is alert           Vital Signs  ED Triage Vitals [07/17/21 1504]   Temperature Pulse Respirations Blood Pressure SpO2   98 °F (36 7 °C) (!) 117 18 142/93 96 %      Temp Source Heart Rate Source Patient Position - Orthostatic VS BP Location FiO2 (%)   Temporal Monitor Sitting Left arm --      Pain Score       4           Vitals:    07/17/21 1504   BP: 142/93   Pulse: (!) 117   Patient Position - Orthostatic VS: Sitting         Visual Acuity      ED Medications  Medications - No data to display    Diagnostic Studies  Results Reviewed     Procedure Component Value Units Date/Time    Comprehensive metabolic panel [[id number]] Collected: 07/17/21 1638    Lab Status: Final result Specimen: Blood from Hand, Right Updated: 07/17/21 1659     Sodium 139 mmol/L      Potassium 3 9 mmol/L      Chloride 104 mmol/L      CO2 27 mmol/L      ANION GAP 8 mmol/L      BUN 11 mg/dL      Creatinine 0 93 mg/dL      Glucose 92 mg/dL      Calcium 8 6 mg/dL      AST 24 U/L      ALT 47 U/L      Alkaline Phosphatase 99 U/L      Total Protein 7 3 g/dL      Albumin 3 6 g/dL      Total Bilirubin 0 34 mg/dL      eGFR 109 ml/min/1 73sq m     Narrative:      National Kidney Disease Foundation guidelines for Chronic Kidney Disease (CKD):     Stage 1 with normal or high GFR (GFR > 90 mL/min/1 73 square meters)    Stage 2 Mild CKD (GFR = 60-89 mL/min/1 73 square meters)    Stage 3A Moderate CKD (GFR = 45-59 mL/min/1 73 square meters)    Stage 3B Moderate CKD (GFR = 30-44 mL/min/1 73 square meters)    Stage 4 Severe CKD (GFR = 15-29 mL/min/1 73 square meters)    Stage 5 End Stage CKD (GFR <15 mL/min/1 73 square meters)  Note: GFR calculation is accurate only with a steady state creatinine    CBC and differential [[id number]]  (Abnormal) Collected: 07/17/21 1638    Lab Status: Final result Specimen: Blood from Hand, Right Updated: 07/17/21 1643     WBC 6 18 Thousand/uL      RBC 5 24 Million/uL      Hemoglobin 16 0 g/dL      Hematocrit 48 2 %      MCV 92 fL      MCH 30 5 pg      MCHC 33 2 g/dL      RDW 12 5 %      MPV 10 4 fL      Platelets 558 Thousands/uL      nRBC 0 /100 WBCs      Neutrophils Relative 43 %      Immat GRANS % 0 %      Lymphocytes Relative 46 %      Monocytes Relative 7 %      Eosinophils Relative 3 %      Basophils Relative 1 %      Neutrophils Absolute 2 66 Thousands/µL      Immature Grans Absolute 0 01 Thousand/uL      Lymphocytes Absolute 2 82 Thousands/µL      Monocytes Absolute 0 46 Thousand/µL      Eosinophils Absolute 0 19 Thousand/µL      Basophils Absolute 0 04 Thousands/µL                  No orders to display Procedures  Procedures         ED Course                                           MDM  Number of Diagnoses or Management Options  Conjunctivitis, acute: new and requires workup  Diagnosis management comments: DDx including but not limited to: conjunctivitis, corneal abrasion, corneal foreign body, iritis, uveitis, UV keratitis, periorbital cellulitis, orbital cellulitis, glaucoma, ruptured globe, vitreous hemorrhage, episcleritis, scleritis, hyphema, subconjunctival hemorrhage  Risk of Complications, Morbidity, and/or Mortality  Presenting problems: low  Management options: low  General comments: Plan/MDM: 30 yo with right eye discoloration  Labs unremarkable  Unclear etiology  Could be conjunctivitis  Start gent drops  Return parameters provided  Pt understands and agrees with plan  Patient Progress  Patient progress: stable      Disposition  Final diagnoses:   Conjunctivitis, acute     Time reflects when diagnosis was documented in both MDM as applicable and the Disposition within this note     Time User Action Codes Description Comment    7/17/2021  5:29 PM Ciro Lutz Add [H10 30] Conjunctivitis, acute       ED Disposition     ED Disposition Condition Date/Time Comment    Discharge Stable Sat Jul 17, 2021  5:29 PM Vandana Austin discharge to home/self care              Follow-up Information     Follow up With Specialties Details Why Contact Info    Lashanda Acevedo MD Family Medicine   [address] [place] [address] Road  [phone]          Discharge Medication List as of 7/17/2021  5:29 PM      START taking these medications    Details   gentamicin (GARAMYCIN) 0 3 % ophthalmic solution Apply 1 drop to eye every 4 (four) hours while awake, Starting Sat 7/17/2021, Print         CONTINUE these medications which have NOT CHANGED    Details   amLODIPine (NORVASC) 5 mg tablet Take 5 mg by mouth daily, Historical Med           No discharge procedures on file      PDMP Review     None          ED Provider  Electronically Signed by           Dahlia Quach PA-C  07/17/21 5349

## 2022-09-05 ENCOUNTER — APPOINTMENT (OUTPATIENT)
Dept: RADIOLOGY | Facility: HOSPITAL | Age: 32
End: 2022-09-05
Payer: COMMERCIAL

## 2022-09-05 ENCOUNTER — HOSPITAL ENCOUNTER (EMERGENCY)
Facility: HOSPITAL | Age: 32
Discharge: HOME | End: 2022-09-05
Attending: EMERGENCY MEDICINE
Payer: COMMERCIAL

## 2022-09-05 VITALS
SYSTOLIC BLOOD PRESSURE: 141 MMHG | DIASTOLIC BLOOD PRESSURE: 85 MMHG | TEMPERATURE: 97.1 F | RESPIRATION RATE: 19 BRPM | OXYGEN SATURATION: 96 % | HEART RATE: 81 BPM

## 2022-09-05 DIAGNOSIS — R07.9 CHEST PAIN: Primary | ICD-10-CM

## 2022-09-05 LAB
2HR DELTA HS TROPONIN: >0 NG/L
ALBUMIN SERPL BCP-MCNC: 3.6 G/DL (ref 3.5–5)
ALP SERPL-CCNC: 130 U/L (ref 46–116)
ALT SERPL W P-5'-P-CCNC: 24 U/L (ref 12–78)
ANION GAP SERPL CALCULATED.3IONS-SCNC: 9 MMOL/L (ref 4–13)
AST SERPL W P-5'-P-CCNC: 19 U/L (ref 5–45)
ATRIAL RATE: 103 BPM
BASOPHILS # BLD AUTO: 0.02 THOUSANDS/ΜL (ref 0–0.1)
BASOPHILS NFR BLD AUTO: 0 % (ref 0–1)
BILIRUB SERPL-MCNC: 0.45 MG/DL (ref 0.2–1)
BUN SERPL-MCNC: 8 MG/DL (ref 5–25)
CALCIUM SERPL-MCNC: 8.7 MG/DL (ref 8.3–10.1)
CARDIAC TROPONIN I PNL SERPL HS: 2 NG/L
CARDIAC TROPONIN I PNL SERPL HS: <2 NG/L
CHLORIDE SERPL-SCNC: 105 MMOL/L (ref 96–108)
CO2 SERPL-SCNC: 28 MMOL/L (ref 21–32)
CREAT SERPL-MCNC: 0.84 MG/DL (ref 0.6–1.3)
D DIMER PPP FEU-MCNC: <0.27 UG/ML FEU
EOSINOPHIL # BLD AUTO: 0.17 THOUSAND/ΜL (ref 0–0.61)
EOSINOPHIL NFR BLD AUTO: 3 % (ref 0–6)
ERYTHROCYTE [DISTWIDTH] IN BLOOD BY AUTOMATED COUNT: 12.6 % (ref 11.6–15.1)
GFR SERPL CREATININE-BSD FRML MDRD: 115 ML/MIN/1.73SQ M
GLUCOSE SERPL-MCNC: 124 MG/DL (ref 65–140)
HCT VFR BLD AUTO: 49.5 % (ref 36.5–49.3)
HGB BLD-MCNC: 16.9 G/DL (ref 12–17)
IMM GRANULOCYTES # BLD AUTO: 0.01 THOUSAND/UL (ref 0–0.2)
IMM GRANULOCYTES NFR BLD AUTO: 0 % (ref 0–2)
LYMPHOCYTES # BLD AUTO: 2.03 THOUSANDS/ΜL (ref 0.6–4.47)
LYMPHOCYTES NFR BLD AUTO: 34 % (ref 14–44)
MCH RBC QN AUTO: 32.2 PG (ref 26.8–34.3)
MCHC RBC AUTO-ENTMCNC: 34.1 G/DL (ref 31.4–37.4)
MCV RBC AUTO: 94 FL (ref 82–98)
MONOCYTES # BLD AUTO: 0.33 THOUSAND/ΜL (ref 0.17–1.22)
MONOCYTES NFR BLD AUTO: 6 % (ref 4–12)
NEUTROPHILS # BLD AUTO: 3.35 THOUSANDS/ΜL (ref 1.85–7.62)
NEUTS SEG NFR BLD AUTO: 57 % (ref 43–75)
NRBC BLD AUTO-RTO: 0 /100 WBCS
P AXIS: 61 DEGREES
PLATELET # BLD AUTO: 216 THOUSANDS/UL (ref 149–390)
PMV BLD AUTO: 10.7 FL (ref 8.9–12.7)
POTASSIUM SERPL-SCNC: 4.1 MMOL/L (ref 3.5–5.3)
PR INTERVAL: 140 MS
PROT SERPL-MCNC: 7.3 G/DL (ref 6.4–8.4)
QRS AXIS: 56 DEGREES
QRSD INTERVAL: 98 MS
QT INTERVAL: 350 MS
QTC INTERVAL: 458 MS
RBC # BLD AUTO: 5.25 MILLION/UL (ref 3.88–5.62)
SODIUM SERPL-SCNC: 142 MMOL/L (ref 135–147)
T WAVE AXIS: 3 DEGREES
VENTRICULAR RATE: 103 BPM
WBC # BLD AUTO: 5.91 THOUSAND/UL (ref 4.31–10.16)

## 2022-09-05 PROCEDURE — 85379 FIBRIN DEGRADATION QUANT: CPT | Performed by: EMERGENCY MEDICINE

## 2022-09-05 PROCEDURE — 93005 ELECTROCARDIOGRAM TRACING: CPT

## 2022-09-05 PROCEDURE — 93010 ELECTROCARDIOGRAM REPORT: CPT | Performed by: INTERNAL MEDICINE

## 2022-09-05 PROCEDURE — 36415 COLL VENOUS BLD VENIPUNCTURE: CPT | Performed by: EMERGENCY MEDICINE

## 2022-09-05 PROCEDURE — 99285 EMERGENCY DEPT VISIT HI MDM: CPT

## 2022-09-05 PROCEDURE — 99285 EMERGENCY DEPT VISIT HI MDM: CPT | Performed by: EMERGENCY MEDICINE

## 2022-09-05 PROCEDURE — 96374 THER/PROPH/DIAG INJ IV PUSH: CPT

## 2022-09-05 PROCEDURE — 85025 COMPLETE CBC W/AUTO DIFF WBC: CPT | Performed by: EMERGENCY MEDICINE

## 2022-09-05 PROCEDURE — 80053 COMPREHEN METABOLIC PANEL: CPT | Performed by: EMERGENCY MEDICINE

## 2022-09-05 PROCEDURE — 84484 ASSAY OF TROPONIN QUANT: CPT | Performed by: EMERGENCY MEDICINE

## 2022-09-05 PROCEDURE — 71046 X-RAY EXAM CHEST 2 VIEWS: CPT

## 2022-09-05 RX ORDER — KETOROLAC TROMETHAMINE 30 MG/ML
15 INJECTION, SOLUTION INTRAMUSCULAR; INTRAVENOUS ONCE
Status: COMPLETED | OUTPATIENT
Start: 2022-09-05 | End: 2022-09-05

## 2022-09-05 RX ADMIN — KETOROLAC TROMETHAMINE 15 MG: 30 INJECTION, SOLUTION INTRAMUSCULAR at 13:49

## 2022-09-05 NOTE — ED PROVIDER NOTES
History  Chief Complaint   Patient presents with    Chest Pain     Patient c/o chest pain that started yesterday  Patient c/o pain gets worse with movement and exertion  Patient denies SOB     Patient is a 78-year-old male past medical history of hypertension, gastric sleeve presenting with chest pain  Patient states that a p m  Last night he began having left-sided burning chest pain which radiates across the right side of his chest and is constant, worse with specific movements and bending forward and states he has never had before denies any injuries  States that it started suddenly while playing video games he has never had before  Denies any shortness breath, nausea vomiting, dizziness, cough, fevers, leg swelling, rashes, vision changes, dysuria  Took Tylenol p m  Last night with no relief  Denies any family history of young or sudden cardiac death under 54, history of blood clots or coagulopathies, recent immobilization, cancer diagnosis, surgeries, use of estrogen products, hemoptysis  Prior to Admission Medications   Prescriptions Last Dose Informant Patient Reported? Taking? amLODIPine (NORVASC) 5 mg tablet   Yes No   Sig: Take 5 mg by mouth daily   gentamicin (GARAMYCIN) 0 3 % ophthalmic solution   No No   Sig: Apply 1 drop to eye every 4 (four) hours while awake      Facility-Administered Medications: None       Past Medical History:   Diagnosis Date    Hypertension        Past Surgical History:   Procedure Laterality Date    ABDOMINAL SURGERY      SHOULDER SURGERY         History reviewed  No pertinent family history  I have reviewed and agree with the history as documented  E-Cigarette/Vaping     E-Cigarette/Vaping Substances     Social History     Tobacco Use    Smoking status: Former Smoker     Packs/day: 0 50    Smokeless tobacco: Never Used   Substance Use Topics    Alcohol use:  Yes    Drug use: Yes     Types: Marijuana       Review of Systems   All other systems reviewed and are negative  Physical Exam  Physical Exam  Vitals reviewed  Constitutional:       General: He is not in acute distress  Appearance: Normal appearance  He is not ill-appearing  HENT:      Mouth/Throat:      Mouth: Mucous membranes are moist    Eyes:      Conjunctiva/sclera: Conjunctivae normal    Cardiovascular:      Rate and Rhythm: Normal rate and regular rhythm  Pulses:           Radial pulses are 2+ on the right side and 2+ on the left side  Heart sounds: Normal heart sounds  Pulmonary:      Effort: Pulmonary effort is normal       Breath sounds: Normal breath sounds  Chest:      Chest wall: No tenderness  Abdominal:      General: Abdomen is flat  Palpations: Abdomen is soft  Tenderness: There is no abdominal tenderness  Musculoskeletal:         General: No swelling  Normal range of motion  Cervical back: Neck supple  Right lower leg: No tenderness  No edema  Left lower leg: No tenderness  No edema  Skin:     General: Skin is warm and dry  Neurological:      General: No focal deficit present  Mental Status: He is alert     Psychiatric:         Mood and Affect: Mood normal          Vital Signs  ED Triage Vitals   Temperature Pulse Respirations Blood Pressure SpO2   09/05/22 1335 09/05/22 1335 09/05/22 1335 09/05/22 1335 09/05/22 1335   (!) 97 1 °F (36 2 °C) (!) 106 18 141/85 99 %      Temp src Heart Rate Source Patient Position - Orthostatic VS BP Location FiO2 (%)   -- -- -- -- --             Pain Score       09/05/22 1349       5           Vitals:    09/05/22 1335 09/05/22 1530   BP: 141/85    Pulse: (!) 106 81         Visual Acuity      ED Medications  Medications   ketorolac (TORADOL) injection 15 mg (15 mg Intravenous Given 9/5/22 1349)       Diagnostic Studies  Results Reviewed     Procedure Component Value Units Date/Time    HS Troponin I 2hr [[id number]]  (Normal) Collected: 09/05/22 1606    Lab Status: Final result Specimen: Blood from Arm, Right Updated: 09/05/22 1635     hs TnI 2hr 2 ng/L      Delta 2hr hsTnI >0 ng/L     HS Troponin I 4hr [[id number]]     Lab Status: No result Specimen: Blood     D-Dimer [[id number]]  (Normal) Collected: 09/05/22 1348    Lab Status: Final result Specimen: Blood from Arm, Right Updated: 09/05/22 1453     D-Dimer, Quant <0 27 ug/ml FEU     HS Troponin 0hr (reflex protocol) [[id number]]  (Normal) Collected: 09/05/22 1348    Lab Status: Final result Specimen: Blood from Arm, Right Updated: 09/05/22 1440     hs TnI 0hr <2 ng/L     Comprehensive metabolic panel [[id number]]  (Abnormal) Collected: 09/05/22 1348    Lab Status: Final result Specimen: Blood from Arm, Right Updated: 09/05/22 1432     Sodium 142 mmol/L      Potassium 4 1 mmol/L      Chloride 105 mmol/L      CO2 28 mmol/L      ANION GAP 9 mmol/L      BUN 8 mg/dL      Creatinine 0 84 mg/dL      Glucose 124 mg/dL      Calcium 8 7 mg/dL      AST 19 U/L      ALT 24 U/L      Alkaline Phosphatase 130 U/L      Total Protein 7 3 g/dL      Albumin 3 6 g/dL      Total Bilirubin 0 45 mg/dL      eGFR 115 ml/min/1 73sq m     Narrative:      [place] guidelines for Chronic Kidney Disease (CKD):     Stage 1 with normal or high GFR (GFR > 90 mL/min/1 73 square meters)    Stage 2 Mild CKD (GFR = 60-89 mL/min/1 73 square meters)    Stage 3A Moderate CKD (GFR = 45-59 mL/min/1 73 square meters)    Stage 3B Moderate CKD (GFR = 30-44 mL/min/1 73 square meters)    Stage 4 Severe CKD (GFR = 15-29 mL/min/1 73 square meters)    Stage 5 End Stage CKD (GFR <15 mL/min/1 73 square meters)  Note: GFR calculation is accurate only with a steady state creatinine    CBC and differential [[id number]]  (Abnormal) Collected: 09/05/22 1348    Lab Status: Final result Specimen: Blood from Arm, Right Updated: 09/05/22 1405     WBC 5 91 Thousand/uL      RBC 5 25 Million/uL      Hemoglobin 16 9 g/dL      Hematocrit 49 5 %      MCV 94 fL      MCH 32 2 pg      MCHC 34 1 g/dL      RDW 12 6 %      MPV 10 7 fL      Platelets 424 Thousands/uL      nRBC 0 /100 WBCs      Neutrophils Relative 57 %      Immat GRANS % 0 %      Lymphocytes Relative 34 %      Monocytes Relative 6 %      Eosinophils Relative 3 %      Basophils Relative 0 %      Neutrophils Absolute 3 35 Thousands/µL      Immature Grans Absolute 0 01 Thousand/uL      Lymphocytes Absolute 2 03 Thousands/µL      Monocytes Absolute 0 33 Thousand/µL      Eosinophils Absolute 0 17 Thousand/µL      Basophils Absolute 0 02 Thousands/µL                  XR chest 2 views   ED Interpretation by Vlad Ballesteros DO (09/05 1410)   NAD                 Procedures  ECG 12 Lead Documentation Only    Date/Time: 9/5/2022 1:48 PM  Performed by: Vlad Ballesteros DO  Authorized by: Vlad Ballesteros DO     ECG reviewed by me, the ED Provider: yes    Patient location:  ED  Previous ECG:     Previous ECG:  Compared to current    Similarity:  No change  Interpretation:     Interpretation: normal    Rate:     ECG rate assessment: normal    Rhythm:     Rhythm: sinus rhythm    Ectopy:     Ectopy: none    QRS:     QRS axis:  Normal    QRS intervals:  Normal  Conduction:     Conduction: normal    ST segments:     ST segments:  Normal  T waves:     T waves: normal               ED Course  ED Course as of 09/05/22 1639   Mon Sep 05, 2022   1636 Patient notes improvement of symptoms, have discussed return precautions outpatient follow-up and patient states he understands               HEART Risk Score    Flowsheet Row Most Recent Value   Heart Score Risk Calculator    History 0 Filed at: 09/05/2022 1638   ECG 0 Filed at: 09/05/2022 1638   Age 0 Filed at: 09/05/2022 1638   Risk Factors 0 Filed at: 09/05/2022 1638   Troponin 0 Filed at: 09/05/2022 1638   HEART Score 0 Filed at: 09/05/2022 1638                        SBIRT 20yo+    Flowsheet Row Most Recent Value   SBIRT (25 yo +)    In order to provide better care to our patients, we are screening all of our patients for alcohol and drug use  Would it be okay to ask you these screening questions? Yes Filed at: 09/05/2022 1347   Initial Alcohol Screen: US AUDIT-C     1  How often do you have a drink containing alcohol? 0 Filed at: 09/05/2022 1347   2  How many drinks containing alcohol do you have on a typical day you are drinking? 0 Filed at: 09/05/2022 1347   3a  Male UNDER 65: How often do you have five or more drinks on one occasion? 0 Filed at: 09/05/2022 1347   Audit-C Score 0 Filed at: 09/05/2022 1347   TEZ: How many times in the past year have you    Used an illegal drug or used a prescription medication for non-medical reasons? Never Filed at: 09/05/2022 1347                    MDM  Number of Diagnoses or Management Options  Diagnosis management comments: Patient is a 76-year-old male past medical history of hypertension and gastric sleeve presenting with chest pain  Patient is well-appearing bedside though with low-grade tachycardia but in no acute distress with equal pulses, lungs clear to auscultation, no lower extremity edema, no reproducible chest pain and no other significant physical exam findings  Will obtain cardiac workup, give pain control and reassess  Disposition  Final diagnoses:   Chest pain     Time reflects when diagnosis was documented in both MDM as applicable and the Disposition within this note     Time User Action Codes Description Comment    9/5/2022  4:38 PM Alyce Jeans Add [R07 9] Chest pain       ED Disposition     ED Disposition   Discharge    Condition   Stable    Date/Time   Mon Sep 5, 2022  4:38 PM    Comment   Obi Chris discharge to home/self care                 Follow-up Information     Follow up With Specialties Details Why Contact Info    Nayeli Chua MD Family Medicine In 1 week  [address]  [phone]            Patient's Medications   Discharge Prescriptions    No medications on file No discharge procedures on file      PDMP Review     None          ED Provider  Electronically Signed by           Nikolas Jett DO  09/05/22 9416

## 2023-02-17 NOTE — ED PROVIDER NOTES
History  Chief Complaint   Patient presents with    Chest Pain     Pt to ED with complaints of chest pain and dizziness that started last night  HPI  25 yo M with PMH obesity, htn presents with chest pain  He states it is left sided and radiates to left arm, started at 11 last night  Was sitting watching TV  Feels like stabbing and has been constant  Nothing makes better or worse  Not exertional  He was seen for similar symptoms in ED a few weeks ago, had negative workup and symptoms resolved  He does not take medication for his blood pressure and has not followed up with a primary doctor  None       Past Medical History:   Diagnosis Date    Hypertension        Past Surgical History:   Procedure Laterality Date    SHOULDER SURGERY         History reviewed  No pertinent family history  I have reviewed and agree with the history as documented  E-Cigarette/Vaping     E-Cigarette/Vaping Substances     Social History     Tobacco Use    Smoking status: Current Every Day Smoker     Packs/day: 0 50    Smokeless tobacco: Never Used   Substance Use Topics    Alcohol use: Yes    Drug use: Yes     Types: Marijuana       Review of Systems   Constitutional: Negative for chills and fever  HENT: Negative for dental problem and ear pain  Eyes: Negative for pain and redness  Respiratory: Negative for cough and shortness of breath  Cardiovascular: Positive for chest pain  Negative for palpitations  Gastrointestinal: Negative for abdominal pain and nausea  Endocrine: Negative for polydipsia and polyphagia  Genitourinary: Negative for dysuria and frequency  Musculoskeletal: Negative for arthralgias and joint swelling         +arm pain   Skin: Negative for color change and rash  Neurological: Negative for dizziness and headaches  Psychiatric/Behavioral: Negative for behavioral problems and confusion  All other systems reviewed and are negative        Physical Exam  Physical Exam  Vitals signs and Rate is controlled - paced today on exam   Now on Xarelto 15, carvedilol  Would need a lovenox bridge for procedures  Seeing Kalli Barba  No symptoms   nursing note reviewed  Constitutional:       General: He is not in acute distress  Appearance: He is well-developed  He is not diaphoretic  HENT:      Head: Atraumatic  Right Ear: External ear normal       Left Ear: External ear normal       Nose: Nose normal    Eyes:      Conjunctiva/sclera: Conjunctivae normal       Pupils: Pupils are equal, round, and reactive to light  Neck:      Musculoskeletal: Normal range of motion and neck supple  Vascular: No JVD  Cardiovascular:      Rate and Rhythm: Normal rate and regular rhythm  Heart sounds: Normal heart sounds  No murmur  Comments: Reproducible pain left chest  Pulmonary:      Effort: Pulmonary effort is normal  No respiratory distress  Breath sounds: Normal breath sounds  No wheezing  Abdominal:      General: Bowel sounds are normal  There is no distension  Palpations: Abdomen is soft  Tenderness: There is no abdominal tenderness  Musculoskeletal: Normal range of motion  Skin:     General: Skin is warm and dry  Capillary Refill: Capillary refill takes less than 2 seconds  Neurological:      Mental Status: He is alert and oriented to person, place, and time  Cranial Nerves: No cranial nerve deficit     Psychiatric:         Behavior: Behavior normal          Vital Signs  ED Triage Vitals [08/04/20 0724]   Temperature Pulse Respirations Blood Pressure SpO2   98 9 °F (37 2 °C) 85 18 (!) 180/99 97 %      Temp src Heart Rate Source Patient Position - Orthostatic VS BP Location FiO2 (%)   -- Monitor Lying Right arm --      Pain Score       8           Vitals:    08/04/20 0724 08/04/20 0830 08/04/20 0900   BP: (!) 180/99 155/78 135/88   Pulse: 85 80 76   Patient Position - Orthostatic VS: Lying Lying          Visual Acuity      ED Medications  Medications   ketorolac (TORADOL) injection 15 mg (15 mg Intravenous Given 8/4/20 0840)       Diagnostic Studies  Results Reviewed     Procedure Component Value Units Date/Time    [place] draw [[id number]] Collected:  08/04/20 0744    Lab Status:  Final result Specimen:  Blood from Arm, Right Updated:  08/04/20 0901    Narrative: The following orders were created for panel order [place] draw  Procedure                               Abnormality         Status                     ---------                               -----------         ------                     Loli Bonilla Top on WIKE[[id number]]                           Final result                 Please view results for these tests on the individual orders      Troponin I [[id number]]  (Normal) Collected:  08/04/20 0744    Lab Status:  Final result Specimen:  Blood from Arm, Right Updated:  08/04/20 0814     Troponin I <0 02 ng/mL     Comprehensive metabolic panel [[id number]]  (Abnormal) Collected:  08/04/20 0744    Lab Status:  Final result Specimen:  Blood from Arm, Right Updated:  08/04/20 5581     Sodium 141 mmol/L      Potassium 3 8 mmol/L      Chloride 105 mmol/L      CO2 26 mmol/L      ANION GAP 10 mmol/L      BUN 14 mg/dL      Creatinine 0 94 mg/dL      Glucose 111 mg/dL      Calcium 8 6 mg/dL      AST 23 U/L      ALT 47 U/L      Alkaline Phosphatase 103 U/L      Total Protein 6 9 g/dL      Albumin 3 4 g/dL      Total Bilirubin 0 30 mg/dL      eGFR 108 ml/min/1 73sq m     Narrative:       [place] guidelines for Chronic Kidney Disease (CKD):     Stage 1 with normal or high GFR (GFR > 90 mL/min/1 73 square meters)    Stage 2 Mild CKD (GFR = 60-89 mL/min/1 73 square meters)    Stage 3A Moderate CKD (GFR = 45-59 mL/min/1 73 square meters)    Stage 3B Moderate CKD (GFR = 30-44 mL/min/1 73 square meters)    Stage 4 Severe CKD (GFR = 15-29 mL/min/1 73 square meters)    Stage 5 End Stage CKD (GFR <15 mL/min/1 73 square meters)  Note: GFR calculation is accurate only with a steady state creatinine    CBC and differential [[id number]] Collected:  08/04/20 0744    Lab Status:  Final result Specimen:  Blood from Arm, Right Updated:  08/04/20 0755     WBC 8 58 Thousand/uL      RBC 4 80 Million/uL      Hemoglobin 14 9 g/dL      Hematocrit 45 0 %      MCV 94 fL      MCH 31 0 pg      MCHC 33 1 g/dL      RDW 12 9 %      MPV 11 2 fL      Platelets 257 Thousands/uL      nRBC 0 /100 WBCs      Neutrophils Relative 46 %      Immat GRANS % 0 %      Lymphocytes Relative 43 %      Monocytes Relative 8 %      Eosinophils Relative 3 %      Basophils Relative 0 %      Neutrophils Absolute 3 90 Thousands/µL      Immature Grans Absolute 0 02 Thousand/uL      Lymphocytes Absolute 3 68 Thousands/µL      Monocytes Absolute 0 70 Thousand/µL      Eosinophils Absolute 0 25 Thousand/µL      Basophils Absolute 0 03 Thousands/µL                  XR chest 2 views   Final Result by Carmelina Wharton DO (08/04 4630)   No acute cardiopulmonary disease  Workstation performed: [id number]                    Procedures  ECG 12 Lead Documentation Only    Date/Time: 8/4/2020 7:29 AM  Performed by: Shahrzad Armendariz MD  Authorized by: Shahrzad Armendariz MD     Comments:      NSR rate of 83 normal axis and intervals no acute st elevations or depressions             ED Course       US AUDIT      Most Recent Value   Initial Alcohol Screen: US AUDIT-C    1  How often do you have a drink containing alcohol?  0 Filed at: 08/04/2020 0726   2  How many drinks containing alcohol do you have on a typical day you are drinking? 0 Filed at: 08/04/2020 0726   3a  Male UNDER 65: How often do you have five or more drinks on one occasion?   0 Filed at: 08/04/2020 0726   Audit-C Score  0 Filed at: 08/04/2020 0726            HEART Risk Score      Most Recent Value   Heart Score Risk Calculator   History  0 Filed at: 08/04/2020 0923   ECG  0 Filed at: 08/04/2020 2925   Age  0 Filed at: 08/04/2020 6287   Risk Factors  1 Filed at: 08/04/2020 0923   Troponin  0 Filed at: 08/04/2020 0923   HEART Score  1 Filed at: 08/04/2020 5932            TEZ/DAST-10 Most Recent Value   How many times in the past year have you    Used an illegal drug or used a prescription medication for non-medical reasons? Daily or Almost Daily Filed at: 08/04/2020 0726   In the past 12 months      1  Have you used drugs other than those required for medical reasons? 0 Filed at: 08/04/2020 0726   2  Do you use more than one drug at a time? 0 Filed at: 08/04/2020 0726   3  Have you had medical problems as a result of your drug use (e g , memory loss, hepatitis, convulsions, bleeding, etc )? 0 Filed at: 08/04/2020 0726   4  Have you had "blackouts" or "flashbacks" as a result of drug use? YesNo  0 Filed at: 08/04/2020 0726   5  Do you ever feel bad or guilty about your drug use? 0 Filed at: 08/04/2020 0726   6  Does your spouse (or parent) ever complain about your involvement with drugs? 0 Filed at: 08/04/2020 0726   7  Have you neglected your family because of your use of drugs? 0 Filed at: 08/04/2020 0726   8  Have you engaged in illegal activities in order to obtain drugs? 0 Filed at: 08/04/2020 0726   9  Have you ever experienced withdrawal symptoms (felt sick) when you stopped taking drugs? 0 Filed at: 08/04/2020 0726   10  Are you always able to stop using drugs when you want to?  0 Filed at: 08/04/2020 0726   DAST-10 Score  0 Filed at: 08/04/2020 0726          PERC Rule for PE      Most Recent Value   PERC Rule for PE   Age >=50  0 Filed at: 08/04/2020 0939   HR >=100  0 Filed at: 08/04/2020 0939   O2 Sat on room air < 95%  0 Filed at: 08/04/2020 0939   History of PE or DVT  0 Filed at: 08/04/2020 0408   Recent trauma or surgery  0 Filed at: 08/04/2020 0939   Hemoptysis  0 Filed at: 08/04/2020 4882   Exogenous estrogen  0 Filed at: 08/04/2020 0939   Unilateral leg swelling  0 Filed at: 08/04/2020 6954   PERC Rule for PE Results  0 Filed at: 08/04/2020 2036                            MDM  27 yo M presents with chest pain   Reproducible on exam  Heart score 1, ekg and trop negative, doubt acs  PERC negative doubt PE  Pain improved with toradol  CXR unremarkable  Recommend f/u with primary care doctor for blood pressure recheck and further management with return precautions given  Disposition  Final diagnoses:   Chest pain     Time reflects when diagnosis was documented in both MDM as applicable and the Disposition within this note     Time User Action Codes Description Comment    8/4/2020  9:23 AM Judy Collins Add [R07 9] Chest pain       ED Disposition     ED Disposition Condition Date/Time Comment    Discharge Stable Tu Aug 4, 2020  9:23 AM Tim Borrero discharge to home/self care  Follow-up Information     Follow up With Specialties Details Why Contact Info    Hannah Penaloza MD Family Medicine Schedule an appointment as soon as possible for a visit  for primary care doctor [address] [place] [address]/[place] [id number]  [phone]            Patient's Medications   Discharge Prescriptions    NAPROXEN (NAPROSYN) 500 MG TABLET    Take 1 tablet (500 mg total) by mouth 2 (two) times a day with meals       Start Date: 8/4/2020  End Date: --       Order Dose: 500 mg       Quantity: 30 tablet    Refills: 0     No discharge procedures on file      PDMP Review     None          ED Provider  Electronically Signed by           Teresa Aguirre MD  08/04/20 4053

## 2024-06-07 ENCOUNTER — APPOINTMENT (OUTPATIENT)
Age: 34
End: 2024-06-07

## 2024-06-07 DIAGNOSIS — Z02.89 ENCOUNTER FOR PHYSICAL EXAMINATION RELATED TO EMPLOYMENT: ICD-10-CM

## 2024-06-07 LAB
ALBUMIN SERPL BCG-MCNC: 4.5 G/DL (ref 3.5–5)
ALP SERPL-CCNC: 92 U/L (ref 34–104)
ALT SERPL W P-5'-P-CCNC: 14 U/L (ref 7–52)
ANION GAP SERPL CALCULATED.3IONS-SCNC: 7 MMOL/L (ref 4–13)
AST SERPL W P-5'-P-CCNC: 19 U/L (ref 13–39)
BASOPHILS # BLD AUTO: 0.05 THOUSANDS/ÂΜL (ref 0–0.1)
BASOPHILS NFR BLD AUTO: 1 % (ref 0–1)
BILIRUB SERPL-MCNC: 0.59 MG/DL (ref 0.2–1)
BUN SERPL-MCNC: 11 MG/DL (ref 5–25)
CALCIUM SERPL-MCNC: 9.2 MG/DL (ref 8.4–10.2)
CHLORIDE SERPL-SCNC: 101 MMOL/L (ref 96–108)
CO2 SERPL-SCNC: 31 MMOL/L (ref 21–32)
CREAT SERPL-MCNC: 0.82 MG/DL (ref 0.6–1.3)
EOSINOPHIL # BLD AUTO: 0.44 THOUSAND/ÂΜL (ref 0–0.61)
EOSINOPHIL NFR BLD AUTO: 8 % (ref 0–6)
ERYTHROCYTE [DISTWIDTH] IN BLOOD BY AUTOMATED COUNT: 12.7 % (ref 11.6–15.1)
GFR SERPL CREATININE-BSD FRML MDRD: 115 ML/MIN/1.73SQ M
GLUCOSE P FAST SERPL-MCNC: 83 MG/DL (ref 65–99)
HCT VFR BLD AUTO: 50.3 % (ref 36.5–49.3)
HGB BLD-MCNC: 16.8 G/DL (ref 12–17)
IMM GRANULOCYTES # BLD AUTO: 0.01 THOUSAND/UL (ref 0–0.2)
IMM GRANULOCYTES NFR BLD AUTO: 0 % (ref 0–2)
LYMPHOCYTES # BLD AUTO: 2.97 THOUSANDS/ÂΜL (ref 0.6–4.47)
LYMPHOCYTES NFR BLD AUTO: 50 % (ref 14–44)
MCH RBC QN AUTO: 31.6 PG (ref 26.8–34.3)
MCHC RBC AUTO-ENTMCNC: 33.4 G/DL (ref 31.4–37.4)
MCV RBC AUTO: 95 FL (ref 82–98)
MONOCYTES # BLD AUTO: 0.39 THOUSAND/ÂΜL (ref 0.17–1.22)
MONOCYTES NFR BLD AUTO: 7 % (ref 4–12)
NEUTROPHILS # BLD AUTO: 2 THOUSANDS/ÂΜL (ref 1.85–7.62)
NEUTS SEG NFR BLD AUTO: 34 % (ref 43–75)
NRBC BLD AUTO-RTO: 0 /100 WBCS
PLATELET # BLD AUTO: 226 THOUSANDS/UL (ref 149–390)
PMV BLD AUTO: 10.9 FL (ref 8.9–12.7)
POTASSIUM SERPL-SCNC: 3.9 MMOL/L (ref 3.5–5.3)
PROT SERPL-MCNC: 6.9 G/DL (ref 6.4–8.4)
RBC # BLD AUTO: 5.32 MILLION/UL (ref 3.88–5.62)
SODIUM SERPL-SCNC: 139 MMOL/L (ref 135–147)
WBC # BLD AUTO: 5.86 THOUSAND/UL (ref 4.31–10.16)

## 2024-06-07 PROCEDURE — 80053 COMPREHEN METABOLIC PANEL: CPT

## 2024-06-07 PROCEDURE — 85025 COMPLETE CBC W/AUTO DIFF WBC: CPT

## 2024-06-07 PROCEDURE — 36415 COLL VENOUS BLD VENIPUNCTURE: CPT

## 2024-06-07 PROCEDURE — 71045 X-RAY EXAM CHEST 1 VIEW: CPT

## 2024-07-26 ENCOUNTER — APPOINTMENT (EMERGENCY)
Dept: RADIOLOGY | Facility: HOSPITAL | Age: 34
End: 2024-07-26

## 2024-07-26 ENCOUNTER — APPOINTMENT (EMERGENCY)
Dept: CT IMAGING | Facility: HOSPITAL | Age: 34
End: 2024-07-26

## 2024-07-26 ENCOUNTER — HOSPITAL ENCOUNTER (EMERGENCY)
Facility: HOSPITAL | Age: 34
Discharge: HOME | End: 2024-07-26

## 2024-07-26 VITALS
TEMPERATURE: 97.5 F | OXYGEN SATURATION: 99 % | SYSTOLIC BLOOD PRESSURE: 149 MMHG | DIASTOLIC BLOOD PRESSURE: 82 MMHG | HEART RATE: 81 BPM | RESPIRATION RATE: 20 BRPM

## 2024-07-26 DIAGNOSIS — S32.009A LUMBAR TRANSVERSE PROCESS FRACTURE (HCC): Primary | ICD-10-CM

## 2024-07-26 DIAGNOSIS — S93.401A RIGHT ANKLE SPRAIN: ICD-10-CM

## 2024-07-26 PROCEDURE — 72131 CT LUMBAR SPINE W/O DYE: CPT

## 2024-07-26 PROCEDURE — 99284 EMERGENCY DEPT VISIT MOD MDM: CPT

## 2024-07-26 PROCEDURE — 96372 THER/PROPH/DIAG INJ SC/IM: CPT

## 2024-07-26 PROCEDURE — 73610 X-RAY EXAM OF ANKLE: CPT

## 2024-07-26 RX ORDER — LIDOCAINE 50 MG/G
2 PATCH TOPICAL ONCE
Status: DISCONTINUED | OUTPATIENT
Start: 2024-07-26 | End: 2024-07-26 | Stop reason: HOSPADM

## 2024-07-26 RX ORDER — METHOCARBAMOL 500 MG/1
500 TABLET, FILM COATED ORAL 2 TIMES DAILY
Qty: 20 TABLET | Refills: 0 | Status: SHIPPED | OUTPATIENT
Start: 2024-07-26

## 2024-07-26 RX ORDER — NAPROXEN 500 MG/1
500 TABLET ORAL 2 TIMES DAILY WITH MEALS
Qty: 30 TABLET | Refills: 0 | Status: SHIPPED | OUTPATIENT
Start: 2024-07-26

## 2024-07-26 RX ORDER — LIDOCAINE 50 MG/G
1 PATCH TOPICAL DAILY
Qty: 10 PATCH | Refills: 0 | Status: SHIPPED | OUTPATIENT
Start: 2024-07-26

## 2024-07-26 RX ORDER — KETOROLAC TROMETHAMINE 30 MG/ML
15 INJECTION, SOLUTION INTRAMUSCULAR; INTRAVENOUS ONCE
Status: COMPLETED | OUTPATIENT
Start: 2024-07-26 | End: 2024-07-26

## 2024-07-26 RX ORDER — OXYCODONE HYDROCHLORIDE 5 MG/1
5 TABLET ORAL EVERY 6 HOURS PRN
Qty: 8 TABLET | Refills: 0 | Status: SHIPPED | OUTPATIENT
Start: 2024-07-26

## 2024-07-26 RX ADMIN — KETOROLAC TROMETHAMINE 15 MG: 30 INJECTION, SOLUTION INTRAMUSCULAR; INTRAVENOUS at 13:19

## 2024-07-26 RX ADMIN — LIDOCAINE 2 PATCH: 50 PATCH CUTANEOUS at 13:23

## 2024-07-26 NOTE — DISCHARGE INSTRUCTIONS
Please wear brace to comfort level. Can remove at night.     Please take pain medication - Naprosyn, Roxicodone as prescribed. Roxicodone may make you drowsy. Robaxin is another medication that is used as a muscle relaxant. Caution when using with Roxicodone.  Using lidocaine patches, please change out the patch every 12 hours.    Please follow up with your PCP in 1 week for re-evaluation. If pain persists, please follow up with Dr. Bowen as directed.     Return to the ED with any new/worsening concerns, especially if you develop loss of sensation to the lower extremities, difficulty ambulating, urinary or bowel incontinence.  
Statement Selected

## 2024-09-19 ENCOUNTER — APPOINTMENT (OUTPATIENT)
Age: 34
End: 2024-09-19

## 2025-05-29 ENCOUNTER — HOSPITAL ENCOUNTER (EMERGENCY)
Facility: HOSPITAL | Age: 35
Discharge: HOME | End: 2025-05-29
Attending: EMERGENCY MEDICINE

## 2025-05-29 ENCOUNTER — APPOINTMENT (EMERGENCY)
Dept: CT IMAGING | Facility: HOSPITAL | Age: 35
End: 2025-05-29

## 2025-05-29 VITALS
RESPIRATION RATE: 18 BRPM | WEIGHT: 245 LBS | HEART RATE: 90 BPM | BODY MASS INDEX: 33.18 KG/M2 | OXYGEN SATURATION: 99 % | TEMPERATURE: 97.7 F | SYSTOLIC BLOOD PRESSURE: 140 MMHG | DIASTOLIC BLOOD PRESSURE: 93 MMHG | HEIGHT: 72 IN
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
## 2025-05-29 DIAGNOSIS — M54.9 BACK PAIN: Primary | ICD-10-CM

## 2025-05-29 DIAGNOSIS — M51.369 L4-L5 DISC BULGE: ICD-10-CM

## 2025-05-29 LAB
ALBUMIN SERPL BCG-MCNC: 4.1 G/DL (ref 3.5–5)
ALP SERPL-CCNC: 92 U/L (ref 34–104)
ALT SERPL W P-5'-P-CCNC: 18 U/L (ref 7–52)
ANION GAP SERPL CALCULATED.3IONS-SCNC: 8 MMOL/L (ref 4–13)
AST SERPL W P-5'-P-CCNC: 19 U/L (ref 13–39)
BASOPHILS # BLD AUTO: 0.04 THOUSANDS/ÂΜL (ref 0–0.1)
BASOPHILS NFR BLD AUTO: 0 % (ref 0–1)
BILIRUB SERPL-MCNC: 0.37 MG/DL (ref 0.2–1)
BUN SERPL-MCNC: 5 MG/DL (ref 5–25)
CALCIUM SERPL-MCNC: 8.5 MG/DL (ref 8.4–10.2)
CHLORIDE SERPL-SCNC: 106 MMOL/L (ref 96–108)
CO2 SERPL-SCNC: 27 MMOL/L (ref 21–32)
CREAT SERPL-MCNC: 0.58 MG/DL (ref 0.6–1.3)
EOSINOPHIL # BLD AUTO: 0.22 THOUSAND/ÂΜL (ref 0–0.61)
EOSINOPHIL NFR BLD AUTO: 2 % (ref 0–6)
ERYTHROCYTE [DISTWIDTH] IN BLOOD BY AUTOMATED COUNT: 12.4 % (ref 11.6–15.1)
GFR SERPL CREATININE-BSD FRML MDRD: 132 ML/MIN/1.73SQ M
GLUCOSE SERPL-MCNC: 93 MG/DL (ref 65–140)
HCT VFR BLD AUTO: 46 % (ref 36.5–49.3)
HGB BLD-MCNC: 15.9 G/DL (ref 12–17)
IMM GRANULOCYTES # BLD AUTO: 0.03 THOUSAND/UL (ref 0–0.2)
IMM GRANULOCYTES NFR BLD AUTO: 0 % (ref 0–2)
LIPASE SERPL-CCNC: 17 U/L (ref 11–82)
LYMPHOCYTES # BLD AUTO: 2.56 THOUSANDS/ÂΜL (ref 0.6–4.47)
LYMPHOCYTES NFR BLD AUTO: 27 % (ref 14–44)
MCH RBC QN AUTO: 31.8 PG (ref 26.8–34.3)
MCHC RBC AUTO-ENTMCNC: 34.6 G/DL (ref 31.4–37.4)
MCV RBC AUTO: 92 FL (ref 82–98)
MONOCYTES # BLD AUTO: 0.51 THOUSAND/ÂΜL (ref 0.17–1.22)
MONOCYTES NFR BLD AUTO: 5 % (ref 4–12)
NEUTROPHILS # BLD AUTO: 6.05 THOUSANDS/ÂΜL (ref 1.85–7.62)
NEUTS SEG NFR BLD AUTO: 66 % (ref 43–75)
NRBC BLD AUTO-RTO: 0 /100 WBCS
PLATELET # BLD AUTO: 202 THOUSANDS/UL (ref 149–390)
PMV BLD AUTO: 10.2 FL (ref 8.9–12.7)
POTASSIUM SERPL-SCNC: 3.2 MMOL/L (ref 3.5–5.3)
PROT SERPL-MCNC: 6.7 G/DL (ref 6.4–8.4)
RBC # BLD AUTO: 5 MILLION/UL (ref 3.88–5.62)
SODIUM SERPL-SCNC: 141 MMOL/L (ref 135–147)
WBC # BLD AUTO: 9.41 THOUSAND/UL (ref 4.31–10.16)

## 2025-05-29 PROCEDURE — 80053 COMPREHEN METABOLIC PANEL: CPT

## 2025-05-29 PROCEDURE — 85025 COMPLETE CBC W/AUTO DIFF WBC: CPT

## 2025-05-29 PROCEDURE — 96361 HYDRATE IV INFUSION ADD-ON: CPT

## 2025-05-29 PROCEDURE — 74177 CT ABD & PELVIS W/CONTRAST: CPT

## 2025-05-29 PROCEDURE — 96374 THER/PROPH/DIAG INJ IV PUSH: CPT

## 2025-05-29 PROCEDURE — 99285 EMERGENCY DEPT VISIT HI MDM: CPT

## 2025-05-29 PROCEDURE — 83690 ASSAY OF LIPASE: CPT

## 2025-05-29 PROCEDURE — 99284 EMERGENCY DEPT VISIT MOD MDM: CPT

## 2025-05-29 PROCEDURE — 36415 COLL VENOUS BLD VENIPUNCTURE: CPT

## 2025-05-29 RX ORDER — KETOROLAC TROMETHAMINE 30 MG/ML
15 INJECTION, SOLUTION INTRAMUSCULAR; INTRAVENOUS ONCE
Status: COMPLETED | OUTPATIENT
Start: 2025-05-29 | End: 2025-05-29

## 2025-05-29 RX ORDER — NAPROXEN 500 MG/1
500 TABLET ORAL 2 TIMES DAILY WITH MEALS
Qty: 30 TABLET | Refills: 0 | Status: SHIPPED | OUTPATIENT
Start: 2025-05-29

## 2025-05-29 RX ORDER — AMLODIPINE BESYLATE 5 MG/1
5 TABLET ORAL ONCE
Status: COMPLETED | OUTPATIENT
Start: 2025-05-29 | End: 2025-05-29

## 2025-05-29 RX ORDER — LIDOCAINE 50 MG/G
1 PATCH TOPICAL DAILY
Qty: 9 PATCH | Refills: 0 | Status: SHIPPED | OUTPATIENT
Start: 2025-05-29

## 2025-05-29 RX ORDER — LIDOCAINE 50 MG/G
1 PATCH TOPICAL ONCE
Status: DISCONTINUED | OUTPATIENT
Start: 2025-05-29 | End: 2025-05-29 | Stop reason: HOSPADM

## 2025-05-29 RX ORDER — POTASSIUM CHLORIDE 1500 MG/1
40 TABLET, EXTENDED RELEASE ORAL ONCE
Status: COMPLETED | OUTPATIENT
Start: 2025-05-29 | End: 2025-05-29

## 2025-05-29 RX ADMIN — IOHEXOL 100 ML: 350 INJECTION, SOLUTION INTRAVENOUS at 05:40

## 2025-05-29 RX ADMIN — KETOROLAC TROMETHAMINE 15 MG: 30 INJECTION, SOLUTION INTRAMUSCULAR at 04:54

## 2025-05-29 RX ADMIN — LIDOCAINE 5% 1 PATCH: 700 PATCH TOPICAL at 06:49

## 2025-05-29 RX ADMIN — SODIUM CHLORIDE 1000 ML: 0.9 INJECTION, SOLUTION INTRAVENOUS at 04:55

## 2025-05-29 RX ADMIN — POTASSIUM CHLORIDE 40 MEQ: 1500 TABLET, EXTENDED RELEASE ORAL at 06:49

## 2025-05-29 RX ADMIN — AMLODIPINE BESYLATE 5 MG: 5 TABLET ORAL at 06:49

## 2025-05-29 NOTE — Clinical Note
Sriram Duong was seen and treated in our emergency department on 5/29/2025.                Diagnosis:     Sriram  is off the rest of the shift today, may return to work on return date.    He may return on this date: 06/02/2025         If you have any questions or concerns, please don't hesitate to call.      Jorge Ledezma PA-C    ______________________________           _______________          _______________  Hospital Representative                              Date                                Time

## 2025-05-29 NOTE — ED PROVIDER NOTES
Time reflects when diagnosis was documented in both MDM as applicable and the Disposition within this note       Time User Action Codes Description Comment    5/29/2025  6:49 AM Jorge Ledezma [M54.9] Back pain     5/29/2025  6:49 AM Jorge Ledezma [W19.XXXA] Fall, initial encounter     5/29/2025  6:49 AM Jorge Ledezma [M51.369] L4-L5 disc bulge           ED Disposition       ED Disposition   Discharge    Condition   Stable    Date/Time   Thu May 29, 2025  6:49 AM    Comment   Sriram Duong discharge to home/self care.                   Assessment & Plan       Medical Decision Making  This patient presents with back pain most consistent with contusion and strain. Differential diagnoses includes lumbago versus musculoskeletal spasm / strain versus sciatica. Less likely sciatica as straight leg raise test was negative. No back pain red flags on history or physical. Presentation not consistent with malignancy (lack of history of malignancy, lack of B symptoms), fracture (negative CT), cauda equina (no bowel or urinary incontinence/retention, no saddle anesthesia, no distal weakness), AAA, viscus perforation, osteomyelitis or epidural abscess (no IVDU, vertebral tenderness), renal colic, pyelonephritis (afebrile, no CVAT, no urinary symptoms). Given the clinical picture, exam and history patient likely has contusion on the low back with chronic disc bulging.  Will refer to comprehensive spine. Prior to discharge, discharge instructions were discussed with patient at bedside. Patient was provided both verbal and written instructions. Patient is understanding of the discharge instructions and is agreeable to plan of care. Return precautions were discussed with patient bedside, patient verbalized understanding of signs and symptoms that would necessitate return to the ED. All questions were answered. Patient was comfortable with the plan of care and discharged to home.       Problems Addressed:  Back pain:  acute illness or injury  Fall, initial encounter: acute illness or injury  L4-L5 disc bulge: acute illness or injury    Amount and/or Complexity of Data Reviewed  Labs: ordered.  Radiology: ordered. Decision-making details documented in ED Course.    Risk  Prescription drug management.        ED Course as of 05/29/25 0749   u May 29, 2025   0622 CT abdomen pelvis with contrast  No findings of acute traumatic injury in the abdomen or pelvis.     Scattered colonic diverticulosis with no inflammatory changes present to suggest acute diverticulitis.     0641 CT recon only lumbar spine  No acute fracture or traumatic subluxation.     Multilevel disc bulges most prominently at L4-5 as described above.         Medications   lidocaine (LIDODERM) 5 % patch 1 patch (1 patch Topical Medication Applied 5/29/25 0649)   sodium chloride 0.9 % bolus 1,000 mL (0 mL Intravenous Stopped 5/29/25 0555)   ketorolac (TORADOL) injection 15 mg (15 mg Intravenous Given 5/29/25 0454)   iohexol (OMNIPAQUE) 350 MG/ML injection (MULTI-DOSE) 100 mL (100 mL Intravenous Given 5/29/25 0540)   potassium chloride (Klor-Con M20) CR tablet 40 mEq (40 mEq Oral Given 5/29/25 0649)   amLODIPine (NORVASC) tablet 5 mg (5 mg Oral Given 5/29/25 0649)       ED Risk Strat Scores                    No data recorded        SBIRT 22yo+      Flowsheet Row Most Recent Value   Initial Alcohol Screen: US AUDIT-C     1. How often do you have a drink containing alcohol? 0 Filed at: 05/29/2025 0402   2. How many drinks containing alcohol do you have on a typical day you are drinking?  0 Filed at: 05/29/2025 0402   3a. Male UNDER 65: How often do you have five or more drinks on one occasion? 0 Filed at: 05/29/2025 0402   3b. FEMALE Any Age, or MALE 65+: How often do you have 4 or more drinks on one occassion? 0 Filed at: 05/29/2025 0402   Audit-C Score 0 Filed at: 05/29/2025 0402   TEZ: How many times in the past year have you...    Used an illegal drug or used a  prescription medication for non-medical reasons? Never Filed at: 05/29/2025 0402                            History of Present Illness       Chief Complaint   Patient presents with    Back Pain    Fall     Pt reports falling and hurting back approx 1.5 hours ago. Pt reports slipping on concrete and hit the curb with his back. Pt denies hitting head, denies LOC, denies blood thinners. Pt into department eating popeyes and drinking vitamin water. Pt is ambulatory back to room with steady gait.        Past Medical History[1]   Past Surgical History[2]   Family History[3]   Social History[4]   E-Cigarette/Vaping    E-Cigarette Use Never User       E-Cigarette/Vaping Substances      I have reviewed and agree with the history as documented.     The patient is a 34 y.o. male with a history of HTN who presents to [facility] with a chief complaint of right lower back pain. Symptoms began this evening when walking and slipping on sidewalk landing on his back and have been constant since onset. His pain is currently rated as a 7/10 in severity and described as sharp without radiation. Associated symptoms include low back pain. Symptoms are aggravated with movement and palpation and alleviating factors include none noted. The patient denies fever, chills, night sweats, chest pain, shortness of breath, cough, hemoptysis, hematemesis, blood thinner use, hematuria, head strike, LOC, syncope, numbness, tingling, weakness, trouble walking, loss of bowel or bladder function, IV drug use. No other reported symptoms at this time.  Patient denies allergies to anything          History provided by:  Patient   used: No    Back Pain  Associated symptoms: no abdominal pain, no chest pain, no dysuria, no fever and no headaches    Fall  Associated symptoms: back pain    Associated symptoms: no abdominal pain, no chest pain, no headaches, no seizures and no vomiting        Review of Systems    Constitutional:  Negative for chills and fever.   HENT:  Negative for ear pain and sore throat.    Eyes:  Negative for pain and visual disturbance.   Respiratory:  Negative for cough and shortness of breath.    Cardiovascular:  Negative for chest pain and palpitations.   Gastrointestinal:  Negative for abdominal pain and vomiting.   Genitourinary:  Negative for dysuria and hematuria.   Musculoskeletal:  Positive for back pain. Negative for arthralgias.   Skin:  Negative for color change and rash.   Neurological:  Negative for dizziness, seizures, syncope, facial asymmetry, light-headedness and headaches.   All other systems reviewed and are negative.          Objective       ED Triage Vitals [05/29/25 0403]   Temperature Pulse Blood Pressure Respirations SpO2 Patient Position - Orthostatic VS   97.7 °F (36.5 °C) (!) 108 152/97 18 99 % Lying      Temp Source Heart Rate Source BP Location FiO2 (%) Pain Score    Oral Monitor Right arm -- 8      Vitals      Date and Time Temp Pulse SpO2 Resp BP Pain Score FACES Pain Rating User   05/29/25 0649 -- 90 99 % 18 140/93 -- --    05/29/25 0630 -- 88 99 % 16 170/103 -- --    05/29/25 0600 -- 87 99 % 18 182/105 -- --    05/29/25 0530 -- 91 100 % 16 157/90 -- --    05/29/25 0500 -- 100 99 % 18 149/90 -- --    05/29/25 0454 -- -- -- -- -- 8 --    05/29/25 0430 -- 101 98 % 18 135/81 -- --    05/29/25 0403 97.7 °F (36.5 °C) 108 99 % 18 152/97 8 --             Physical Exam  Vitals reviewed.   Constitutional:       General: He is not in acute distress.     Appearance: Normal appearance. He is not ill-appearing or toxic-appearing.   HENT:      Head: Normocephalic.      Nose: Nose normal.      Mouth/Throat:      Pharynx: Oropharynx is clear.     Eyes:      General: No scleral icterus.     Conjunctiva/sclera: Conjunctivae normal.       Cardiovascular:      Rate and Rhythm: Normal rate.      Pulses: Normal pulses.   Pulmonary:      Effort: Pulmonary effort is normal. No  respiratory distress.      Breath sounds: Normal breath sounds. No stridor. No wheezing, rhonchi or rales.   Abdominal:      General: Abdomen is flat.      Palpations: Abdomen is soft.      Tenderness: There is no abdominal tenderness. There is no right CVA tenderness or left CVA tenderness.     Musculoskeletal:         General: Tenderness and signs of injury present. No deformity.      Cervical back: Normal range of motion and neck supple.      Right lower leg: No edema.      Left lower leg: No edema.      Comments: Neurovascularly intact with full range of motion of both upper and lower extremities, strength 5/5, pedal pulses 2+, cap refill <2 sec     Skin:     General: Skin is warm and dry.      Capillary Refill: Capillary refill takes less than 2 seconds.      Coloration: Skin is not jaundiced or pale.      Findings: No bruising, erythema or lesion.     Neurological:      Mental Status: He is alert and oriented to person, place, and time. Mental status is at baseline.      Sensory: No sensory deficit.      Coordination: Coordination normal.      Gait: Gait normal.         Results Reviewed       Procedure Component Value Units Date/Time    Comprehensive metabolic panel [[id number]]  (Abnormal) Collected: 05/29/25 0455    Lab Status: Final result Specimen: Blood from Arm, Right Updated: 05/29/25 0523     Sodium 141 mmol/L      Potassium 3.2 mmol/L      Chloride 106 mmol/L      CO2 27 mmol/L      ANION GAP 8 mmol/L      BUN 5 mg/dL      Creatinine 0.58 mg/dL      Glucose 93 mg/dL      Calcium 8.5 mg/dL      AST 19 U/L      ALT 18 U/L      Alkaline Phosphatase 92 U/L      Total Protein 6.7 g/dL      Albumin 4.1 g/dL      Total Bilirubin 0.37 mg/dL      eGFR 132 ml/min/1.73sq m     Narrative:      [employer] guidelines for Chronic Kidney Disease (CKD):     Stage 1 with normal or high GFR (GFR > 90 mL/min/1.73 square meters)    Stage 2 Mild CKD (GFR = 60-89 mL/min/1.73 square meters)    Stage 3A  Moderate CKD (GFR = 45-59 mL/min/1.73 square meters)    Stage 3B Moderate CKD (GFR = 30-44 mL/min/1.73 square meters)    Stage 4 Severe CKD (GFR = 15-29 mL/min/1.73 square meters)    Stage 5 End Stage CKD (GFR <15 mL/min/1.73 square meters)  Note: GFR calculation is accurate only with a steady state creatinine    Lipase [[id number]]  (Normal) Collected: 05/29/25 0455    Lab Status: Final result Specimen: Blood from Arm, Right Updated: 05/29/25 0523     Lipase 17 u/L     CBC and differential [[id number]] Collected: 05/29/25 0455    Lab Status: Final result Specimen: Blood from Arm, Right Updated: 05/29/25 0504     WBC 9.41 Thousand/uL      RBC 5.00 Million/uL      Hemoglobin 15.9 g/dL      Hematocrit 46.0 %      MCV 92 fL      MCH 31.8 pg      MCHC 34.6 g/dL      RDW 12.4 %      MPV 10.2 fL      Platelets 202 Thousands/uL      nRBC 0 /100 WBCs      Segmented % 66 %      Immature Grans % 0 %      Lymphocytes % 27 %      Monocytes % 5 %      Eosinophils Relative 2 %      Basophils Relative 0 %      Absolute Neutrophils 6.05 Thousands/µL      Absolute Immature Grans 0.03 Thousand/uL      Absolute Lymphocytes 2.56 Thousands/µL      Absolute Monocytes 0.51 Thousand/µL      Eosinophils Absolute 0.22 Thousand/µL      Basophils Absolute 0.04 Thousands/µL     UA w Reflex to Microscopic w Reflex to Culture [[id number]]     Lab Status: No result Specimen: Urine             CT abdomen pelvis with contrast   Final Interpretation by Chaz Bailey MD (05/29 0620)      No findings of acute traumatic injury in the abdomen or pelvis.      Scattered colonic diverticulosis with no inflammatory changes present to suggest acute diverticulitis.         Workstation performed: [id number]         CT recon only lumbar spine   Final Interpretation by Chaz Bailey MD (05/29 0624)      No acute fracture or traumatic subluxation.      Multilevel disc bulges most prominently at L4-5 as described above.      Workstation performed: [id number]              Procedures    ED Medication and Procedure Management   Prior to Admission Medications   Prescriptions Last Dose Informant Patient Reported? Taking?   amLODIPine (NORVASC) 5 mg tablet   Yes No   Sig: Take 5 mg by mouth daily   gentamicin (GARAMYCIN) 0.3 % ophthalmic solution   No No   Sig: Apply 1 drop to eye every 4 (four) hours while awake   lidocaine (Lidoderm) 5 %   No No   Sig: Apply 1 patch topically over 12 hours daily Remove & Discard patch within 12 hours or as directed by MD   methocarbamol (ROBAXIN) 500 mg tablet   No No   Sig: Take 1 tablet (500 mg total) by mouth 2 (two) times a day   naproxen (Naprosyn) 500 mg tablet   No No   Sig: Take 1 tablet (500 mg total) by mouth 2 (two) times a day with meals   oxyCODONE (Roxicodone) 5 immediate release tablet   No No   Sig: Take 1 tablet (5 mg total) by mouth every 6 (six) hours as needed for moderate pain for up to 8 doses Max Daily Amount: 20 mg      Facility-Administered Medications: None     Discharge Medication List as of 5/29/2025  6:52 AM        START taking these medications    Details   !! lidocaine (Lidoderm) 5 % Apply 1 patch topically daily over 12 hours Remove & Discard patch within 12 hours or as directed by MD, Starting Thu 5/29/2025, Normal      !! naproxen (Naprosyn) 500 mg tablet Take 1 tablet (500 mg total) by mouth 2 (two) times a day with meals, Starting Thu 5/29/2025, Normal       !! - Potential duplicate medications found. Please discuss with provider.        CONTINUE these medications which have NOT CHANGED    Details   amLODIPine (NORVASC) 5 mg tablet Take 5 mg by mouth daily, Historical Med      gentamicin (GARAMYCIN) 0.3 % ophthalmic solution Apply 1 drop to eye every 4 (four) hours while awake, Starting Sat 7/17/2021, Print      !! lidocaine (Lidoderm) 5 % Apply 1 patch topically over 12 hours daily Remove & Discard patch within 12 hours or as directed by MD, Starting Fri 7/26/2024, Normal      methocarbamol (ROBAXIN) 500  mg tablet Take 1 tablet (500 mg total) by mouth 2 (two) times a day, Starting Fri 7/26/2024, Normal      !! naproxen (Naprosyn) 500 mg tablet Take 1 tablet (500 mg total) by mouth 2 (two) times a day with meals, Starting Fri 7/26/2024, Normal      oxyCODONE (Roxicodone) 5 immediate release tablet Take 1 tablet (5 mg total) by mouth every 6 (six) hours as needed for moderate pain for up to 8 doses Max Daily Amount: 20 mg, Starting Fri 7/26/2024, Normal       !! - Potential duplicate medications found. Please discuss with provider.          ED SEPSIS DOCUMENTATION   Time reflects when diagnosis was documented in both MDM as applicable and the Disposition within this note       Time User Action Codes Description Comment    5/29/2025  6:49 AM Jorge Ledezma [M54.9] Back pain     5/29/2025  6:49 AM Jorge Ledezma [W19.XXXA] Fall, initial encounter     5/29/2025  6:49 AM Jorge Ledezma [M51.369] L4-L5 disc bulge                      [1]   Past Medical History:  Diagnosis Date    Hypertension    [2]   Past Surgical History:  Procedure Laterality Date    ABDOMINAL SURGERY      SHOULDER SURGERY     [3] No family history on file.  [4]   Social History  Tobacco Use    Smoking status: Former     Current packs/day: 0.50     Types: Cigarettes    Smokeless tobacco: Never   Vaping Use    Vaping status: Never Used   Substance Use Topics    Alcohol use: Yes     Comment: occ    Drug use: Yes     Types: Marijuana        Jorge Ledezma PA-C  05/29/25 0749

## 2025-05-29 NOTE — DISCHARGE INSTRUCTIONS
Follow-up with PCP and comprehensive [facility].  Take medicine as prescribed.  If any symptoms worsen or new symptoms appear return to the ER.

## 2025-05-30 ENCOUNTER — TELEPHONE (OUTPATIENT)
Dept: PHYSICAL THERAPY | Facility: OTHER | Age: 35
End: 2025-05-30

## 2025-05-30 NOTE — TELEPHONE ENCOUNTER
Call placed to the patient per [facility] referral.    While explaining the program to the patient he disconnected the call.     Referral Closed.

## 2025-07-02 ENCOUNTER — HOSPITAL ENCOUNTER (EMERGENCY)
Facility: HOSPITAL | Age: 35
Discharge: HOME | End: 2025-07-02
Attending: EMERGENCY MEDICINE

## 2025-07-02 VITALS
WEIGHT: 245 LBS | HEART RATE: 96 BPM | OXYGEN SATURATION: 95 % | RESPIRATION RATE: 20 BRPM | BODY MASS INDEX: 33.23 KG/M2 | SYSTOLIC BLOOD PRESSURE: 149 MMHG | DIASTOLIC BLOOD PRESSURE: 84 MMHG | TEMPERATURE: 97.6 F

## 2025-07-02 DIAGNOSIS — J40 BRONCHITIS: Primary | ICD-10-CM

## 2025-07-02 LAB
ALBUMIN SERPL BCG-MCNC: 4.3 G/DL (ref 3.5–5)
ALP SERPL-CCNC: 99 U/L (ref 34–104)
ALT SERPL W P-5'-P-CCNC: 13 U/L (ref 7–52)
ANION GAP SERPL CALCULATED.3IONS-SCNC: 9 MMOL/L (ref 4–13)
AST SERPL W P-5'-P-CCNC: 25 U/L (ref 13–39)
BASOPHILS # BLD AUTO: 0.03 THOUSANDS/ÂΜL (ref 0–0.1)
BASOPHILS NFR BLD AUTO: 1 % (ref 0–1)
BILIRUB SERPL-MCNC: 0.63 MG/DL (ref 0.2–1)
BUN SERPL-MCNC: 8 MG/DL (ref 5–25)
CALCIUM SERPL-MCNC: 9.1 MG/DL (ref 8.4–10.2)
CHLORIDE SERPL-SCNC: 106 MMOL/L (ref 96–108)
CO2 SERPL-SCNC: 22 MMOL/L (ref 21–32)
CREAT SERPL-MCNC: 0.66 MG/DL (ref 0.6–1.3)
EOSINOPHIL # BLD AUTO: 0.13 THOUSAND/ÂΜL (ref 0–0.61)
EOSINOPHIL NFR BLD AUTO: 2 % (ref 0–6)
ERYTHROCYTE [DISTWIDTH] IN BLOOD BY AUTOMATED COUNT: 12.6 % (ref 11.6–15.1)
FLUAV AG UPPER RESP QL IA.RAPID: NEGATIVE
FLUBV AG UPPER RESP QL IA.RAPID: NEGATIVE
GFR SERPL CREATININE-BSD FRML MDRD: 125 ML/MIN/1.73SQ M
GLUCOSE SERPL-MCNC: 121 MG/DL (ref 65–140)
HCT VFR BLD AUTO: 48.3 % (ref 36.5–49.3)
HGB BLD-MCNC: 16.4 G/DL (ref 12–17)
IMM GRANULOCYTES # BLD AUTO: 0.01 THOUSAND/UL (ref 0–0.2)
IMM GRANULOCYTES NFR BLD AUTO: 0 % (ref 0–2)
LIPASE SERPL-CCNC: 22 U/L (ref 11–82)
LYMPHOCYTES # BLD AUTO: 1.75 THOUSANDS/ÂΜL (ref 0.6–4.47)
LYMPHOCYTES NFR BLD AUTO: 28 % (ref 14–44)
MCH RBC QN AUTO: 31.7 PG (ref 26.8–34.3)
MCHC RBC AUTO-ENTMCNC: 34 G/DL (ref 31.4–37.4)
MCV RBC AUTO: 93 FL (ref 82–98)
MONOCYTES # BLD AUTO: 0.89 THOUSAND/ÂΜL (ref 0.17–1.22)
MONOCYTES NFR BLD AUTO: 14 % (ref 4–12)
NEUTROPHILS # BLD AUTO: 3.37 THOUSANDS/ÂΜL (ref 1.85–7.62)
NEUTS SEG NFR BLD AUTO: 55 % (ref 43–75)
NRBC BLD AUTO-RTO: 0 /100 WBCS
PLATELET # BLD AUTO: 183 THOUSANDS/UL (ref 149–390)
PMV BLD AUTO: 10.1 FL (ref 8.9–12.7)
POTASSIUM SERPL-SCNC: 3.6 MMOL/L (ref 3.5–5.3)
PROT SERPL-MCNC: 7 G/DL (ref 6.4–8.4)
RBC # BLD AUTO: 5.18 MILLION/UL (ref 3.88–5.62)
SARS-COV+SARS-COV-2 AG RESP QL IA.RAPID: NEGATIVE
SODIUM SERPL-SCNC: 137 MMOL/L (ref 135–147)
WBC # BLD AUTO: 6.18 THOUSAND/UL (ref 4.31–10.16)

## 2025-07-02 PROCEDURE — 36415 COLL VENOUS BLD VENIPUNCTURE: CPT

## 2025-07-02 PROCEDURE — 99284 EMERGENCY DEPT VISIT MOD MDM: CPT

## 2025-07-02 PROCEDURE — 87804 INFLUENZA ASSAY W/OPTIC: CPT

## 2025-07-02 PROCEDURE — 87811 SARS-COV-2 COVID19 W/OPTIC: CPT

## 2025-07-02 PROCEDURE — 99284 EMERGENCY DEPT VISIT MOD MDM: CPT | Performed by: EMERGENCY MEDICINE

## 2025-07-02 PROCEDURE — 83690 ASSAY OF LIPASE: CPT

## 2025-07-02 PROCEDURE — 85025 COMPLETE CBC W/AUTO DIFF WBC: CPT

## 2025-07-02 PROCEDURE — 80053 COMPREHEN METABOLIC PANEL: CPT

## 2025-07-02 RX ORDER — ALBUTEROL SULFATE 90 UG/1
2 INHALANT RESPIRATORY (INHALATION) EVERY 6 HOURS PRN
Qty: 18 G | Refills: 0 | Status: SHIPPED | OUTPATIENT
Start: 2025-07-02

## 2025-07-02 RX ORDER — AZITHROMYCIN 250 MG/1
TABLET, FILM COATED ORAL
Qty: 6 TABLET | Refills: 0 | Status: SHIPPED | OUTPATIENT
Start: 2025-07-02 | End: 2025-07-06

## 2025-07-02 NOTE — Clinical Note
Sriram Duong was seen and treated in our emergency department on 7/2/2025.                Diagnosis:     Sriram  may return to work on return date.    He may return on this date: 07/03/2025         If you have any questions or concerns, please don't hesitate to call.      Sourav Leahy MD    ______________________________           _______________          _______________  Hospital Representative                              Date                                Time

## 2025-07-03 NOTE — ED PROVIDER NOTES
Time reflects when diagnosis was documented in both MDM as applicable and the Disposition within this note       Time User Action Codes Description Comment    7/2/2025 10:02 PM Sourav Leahy Add [J40] Bronchitis           ED Disposition       ED Disposition   Discharge    Condition   Stable    Date/Time   Wed Jul 2, 2025 10:02 PM    Comment   Sriram Duong discharge to home/self care.                   Assessment & Plan       Medical Decision Making  COVID and flu negative.  Laboratory evaluation was nonspecific.  Does not sepsis.  Considered but doubt pneumonia.  Neck is supple.  This is not meningitis.  As patient is a smoker will cover for bronchitis.  Viral syndrome also in the differential diagnosis.  No respiratory distress.  Normal oxygen saturations.  Appropriate for discharge and outpatient management.    Amount and/or Complexity of Data Reviewed  Labs: ordered. Decision-making details documented in ED Course.    Risk  Prescription drug management.  Decision regarding hospitalization.             Medications - No data to display    ED Risk Strat Scores                    No data recorded        SBIRT 22yo+      Flowsheet Row Most Recent Value   Initial Alcohol Screen: US AUDIT-C     1. How often do you have a drink containing alcohol? 0 Filed at: 07/02/2025 2141   2. How many drinks containing alcohol do you have on a typical day you are drinking?  0 Filed at: 07/02/2025 2141   3a. Male UNDER 65: How often do you have five or more drinks on one occasion? 0 Filed at: 07/02/2025 2141   Audit-C Score 0 Filed at: 07/02/2025 2141   ETZ: How many times in the past year have you...    Used an illegal drug or used a prescription medication for non-medical reasons? Never Filed at: 07/02/2025 2141                            History of Present Illness       Chief Complaint   Patient presents with    Flu Symptoms     States n/v/d and body aches x 4 days. States he has a headache and eye pressure       Past Medical  History[1]   Past Surgical History[2]   Family History[3]   Social History[4]   E-Cigarette/Vaping    E-Cigarette Use Never User       E-Cigarette/Vaping Substances      I have reviewed and agree with the history as documented.     Patient is a 34-year-old male.  He is been sick for couple days.  Other coworkers are sick with the same.  He has had a cough and congestion.  Reports fever.  Sore throat.  He has headache.  He has had nausea vomiting and diarrhea.  Patient is a smoker.        Review of Systems   Constitutional:  Positive for fever.   HENT:  Positive for congestion and sore throat.    Respiratory:  Positive for cough and chest tightness.    Gastrointestinal:  Positive for diarrhea and vomiting.   Neurological:  Positive for headaches.   All other systems reviewed and are negative.          Objective       ED Triage Vitals [07/02/25 2116]   Temperature Pulse Blood Pressure Respirations SpO2 Patient Position - Orthostatic VS   97.6 °F (36.4 °C) 96 149/84 20 95 % --      Temp src Heart Rate Source BP Location FiO2 (%) Pain Score    -- Monitor -- -- --      Vitals      Date and Time Temp Pulse SpO2 Resp BP Pain Score FACES Pain Rating User   07/02/25 2116 97.6 °F (36.4 °C) 96 95 % 20 149/84 -- -- AM            Physical Exam  Vitals reviewed.   Constitutional:       General: He is not in acute distress.     Appearance: He is not toxic-appearing.   HENT:      Head: Normocephalic and atraumatic.      Nose: Congestion present.      Mouth/Throat:      Mouth: Mucous membranes are moist.      Pharynx: No oropharyngeal exudate or posterior oropharyngeal erythema.     Eyes:      General:         Right eye: No discharge.         Left eye: No discharge.      Conjunctiva/sclera: Conjunctivae normal.       Cardiovascular:      Rate and Rhythm: Normal rate and regular rhythm.      Pulses: Normal pulses.      Heart sounds: Normal heart sounds. No murmur heard.     No friction rub. No gallop.   Pulmonary:      Effort:  Pulmonary effort is normal. No respiratory distress.      Breath sounds: No stridor. Wheezing present. No rhonchi or rales.      Comments: There is a rare wheeze.  Abdominal:      General: Bowel sounds are normal. There is no distension.      Palpations: Abdomen is soft.      Tenderness: There is no abdominal tenderness. There is no right CVA tenderness, left CVA tenderness, guarding or rebound.     Musculoskeletal:         General: No swelling, tenderness, deformity or signs of injury. Normal range of motion.      Cervical back: Normal range of motion and neck supple. No rigidity.      Right lower leg: No edema.      Left lower leg: No edema.      Comments: No calf pain or unilateral leg swelling     Skin:     General: Skin is warm and dry.      Coloration: Skin is not jaundiced or pale.      Findings: No bruising, erythema or rash.     Neurological:      General: No focal deficit present.      Mental Status: He is alert and oriented to person, place, and time.      Cranial Nerves: No facial asymmetry.      Sensory: No sensory deficit.      Motor: Motor function is intact.     Psychiatric:         Mood and Affect: Mood normal.         Behavior: Behavior normal.         Results Reviewed       Procedure Component Value Units Date/Time    Comprehensive metabolic panel [[id number]] Collected: 07/02/25 2119    Lab Status: Final result Specimen: Blood from Arm, Right Updated: 07/02/25 2144     Sodium 137 mmol/L      Potassium 3.6 mmol/L      Chloride 106 mmol/L      CO2 22 mmol/L      ANION GAP 9 mmol/L      BUN 8 mg/dL      Creatinine 0.66 mg/dL      Glucose 121 mg/dL      Calcium 9.1 mg/dL      AST 25 U/L      ALT 13 U/L      Alkaline Phosphatase 99 U/L      Total Protein 7.0 g/dL      Albumin 4.3 g/dL      Total Bilirubin 0.63 mg/dL      eGFR 125 ml/min/1.73sq m     Narrative:      [employer] Kid[employer] guidelines for Chronic Kidney Disease (CKD):     Stage 1 with normal or high GFR (GFR > 90 mL/min/1.73  square meters)    Stage 2 Mild CKD (GFR = 60-89 mL/min/1.73 square meters)    Stage 3A Moderate CKD (GFR = 45-59 mL/min/1.73 square meters)    Stage 3B Moderate CKD (GFR = 30-44 mL/min/1.73 square meters)    Stage 4 Severe CKD (GFR = 15-29 mL/min/1.73 square meters)    Stage 5 End Stage CKD (GFR <15 mL/min/1.73 square meters)  Note: GFR calculation is accurate only with a steady state creatinine    Lipase [[id number]]  (Normal) Collected: 07/02/25 2119    Lab Status: Final result Specimen: Blood from Arm, Right Updated: 07/02/25 2144     Lipase 22 u/L     FLU/COVID Rapid Antigen (30 min. TAT) - Preferred screening test in ED [[id number]]  (Normal) Collected: 07/02/25 2119    Lab Status: Final result Specimen: Nares from Nose Updated: 07/02/25 2141     SARS COV Rapid Antigen Negative     Influenza A Rapid Antigen Negative     Influenza B Rapid Antigen Negative    Narrative:      This test has been performed using the [employer] Emmy 2 FLU+SARS Antigen test under the Emergency Use Authorization (EUA). This test has been validated by the  and verified by the performing laboratory. The Emmy uses lateral flow immunofluorescent sandwich assay to detect SARS-COV, Influenza A and Influenza B Antigen.     The Quidel Emmy 2 SARS Antigen test does not differentiate between SARS-CoV and SARS-CoV-2.     Negative results are presumptive and may be confirmed with a molecular assay, if necessary, for patient management. Negative results do not rule out SARS-CoV-2 or influenza infection and should not be used as the sole basis for treatment or patient management decisions. A negative test result may occur if the level of antigen in a sample is below the limit of detection of this test.     Positive results are indicative of the presence of viral antigens, but do not rule out bacterial infection or co-infection with other viruses.     All test results should be used as an adjunct to clinical observations and other  information available to the provider.    FOR PEDIATRIC PATIENTS - copy/paste COVID Guidelines URL to browser: [email or web address]    CBC and differential [[id number]]  (Abnormal) Collected: 07/02/25 2119    Lab Status: Final result Specimen: Blood from Arm, Right Updated: 07/02/25 2128     WBC 6.18 Thousand/uL      RBC 5.18 Million/uL      Hemoglobin 16.4 g/dL      Hematocrit 48.3 %      MCV 93 fL      MCH 31.7 pg      MCHC 34.0 g/dL      RDW 12.6 %      MPV 10.1 fL      Platelets 183 Thousands/uL      nRBC 0 /100 WBCs      Segmented % 55 %      Immature Grans % 0 %      Lymphocytes % 28 %      Monocytes % 14 %      Eosinophils Relative 2 %      Basophils Relative 1 %      Absolute Neutrophils 3.37 Thousands/µL      Absolute Immature Grans 0.01 Thousand/uL      Absolute Lymphocytes 1.75 Thousands/µL      Absolute Monocytes 0.89 Thousand/µL      Eosinophils Absolute 0.13 Thousand/µL      Basophils Absolute 0.03 Thousands/µL             No orders to display       Procedures    ED Medication and Procedure Management   Prior to Admission Medications   Prescriptions Last Dose Informant Patient Reported? Taking?   amLODIPine (NORVASC) 5 mg tablet   Yes No   Sig: Take 5 mg by mouth daily   gentamicin (GARAMYCIN) 0.3 % ophthalmic solution   No No   Sig: Apply 1 drop to eye every 4 (four) hours while awake   lidocaine (Lidoderm) 5 %   No No   Sig: Apply 1 patch topically over 12 hours daily Remove & Discard patch within 12 hours or as directed by MD   lidocaine (Lidoderm) 5 %   No No   Sig: Apply 1 patch topically daily over 12 hours Remove & Discard patch within 12 hours or as directed by MD   methocarbamol (ROBAXIN) 500 mg tablet   No No   Sig: Take 1 tablet (500 mg total) by mouth 2 (two) times a day   naproxen (Naprosyn) 500 mg tablet   No No   Sig: Take 1 tablet (500 mg total) by mouth 2 (two) times a day with meals   naproxen (Naprosyn) 500 mg tablet   No No   Sig:  Take 1 tablet (500 mg total) by mouth 2 (two) times a day with meals   oxyCODONE (Roxicodone) 5 immediate release tablet   No No   Sig: Take 1 tablet (5 mg total) by mouth every 6 (six) hours as needed for moderate pain for up to 8 doses Max Daily Amount: 20 mg      Facility-Administered Medications: None     Patient's Medications   Discharge Prescriptions    ALBUTEROL (PROVENTIL HFA,VENTOLIN HFA) 90 MCG/ACT INHALER    Inhale 2 puffs every 6 (six) hours as needed for wheezing or shortness of breath       Start Date: 7/2/2025  End Date: --       Order Dose: 2 puffs       Quantity: 18 g    Refills: 0    AZITHROMYCIN (ZITHROMAX Z-PETRA) 250 MG TABLET    Take 2 tablets today then 1 tablet daily x 4 days       Start Date: 7/2/2025  End Date: 7/6/2025       Order Dose: --       Quantity: 6 tablet    Refills: 0     No discharge procedures on file.  ED SEPSIS DOCUMENTATION   Time reflects when diagnosis was documented in both MDM as applicable and the Disposition within this note       Time User Action Codes Description Comment    7/2/2025 10:02 PM Sourav Leahy Add [J40] Bronchitis                    [1]   Past Medical History:  Diagnosis Date    Hypertension    [2]   Past Surgical History:  Procedure Laterality Date    ABDOMINAL SURGERY      SHOULDER SURGERY     [3] No family history on file.  [4]   Social History  Tobacco Use    Smoking status: Former     Current packs/day: 0.50     Types: Cigarettes    Smokeless tobacco: Never   Vaping Use    Vaping status: Never Used   Substance Use Topics    Alcohol use: Yes     Comment: occ    Drug use: Yes     Types: Marijuana        Sourav Leahy MD  07/02/25 3115